# Patient Record
Sex: FEMALE | Race: WHITE | NOT HISPANIC OR LATINO | Employment: OTHER | URBAN - NONMETROPOLITAN AREA
[De-identification: names, ages, dates, MRNs, and addresses within clinical notes are randomized per-mention and may not be internally consistent; named-entity substitution may affect disease eponyms.]

---

## 2017-02-09 ENCOUNTER — OFFICE VISIT (OUTPATIENT)
Dept: ORTHOPEDIC SURGERY | Facility: CLINIC | Age: 67
End: 2017-02-09

## 2017-02-09 DIAGNOSIS — M17.12 PRIMARY OSTEOARTHRITIS OF LEFT KNEE: Primary | ICD-10-CM

## 2017-02-09 PROCEDURE — 20610 DRAIN/INJ JOINT/BURSA W/O US: CPT | Performed by: ORTHOPAEDIC SURGERY

## 2017-02-09 RX ADMIN — LIDOCAINE HYDROCHLORIDE 2 ML: 10 INJECTION, SOLUTION INFILTRATION; PERINEURAL at 13:01

## 2017-02-09 RX ADMIN — METHYLPREDNISOLONE ACETATE 160 MG: 80 INJECTION, SUSPENSION INTRA-ARTICULAR; INTRALESIONAL; INTRAMUSCULAR; SOFT TISSUE at 13:01

## 2017-02-09 NOTE — PROGRESS NOTES
Ronit Sarkar  ?  1950  ?  Chief Complaint   Patient presents with   • Left Knee - Follow-up     ?  HPI  Patient returns for an injection in the left knee today.  She has some medial sided joint line pain and tenderness.  Some difficulty in going up and down the steps.  Occasionally the knee will buckle and give out from underneath her.    Physical Exam    Review of Systems   Constitutional: Negative for chills, diaphoresis, fever and unexpected weight change.   HENT: Negative for hearing loss, nosebleeds, sore throat and tinnitus.    Eyes: Negative for pain and visual disturbance.   Respiratory: Negative for cough, shortness of breath and wheezing.    Cardiovascular: Negative for chest pain and palpitations.   Gastrointestinal: Negative for abdominal pain, diarrhea, nausea and vomiting.   Endocrine: Negative for cold intolerance, heat intolerance and polydipsia.   Genitourinary: Negative for difficulty urinating, dysuria and hematuria.   Musculoskeletal: Negative for arthralgias, joint swelling and myalgias.   Skin: Negative for rash and wound.   Allergic/Immunologic: Negative for environmental allergies.   Neurological: Negative for dizziness, syncope and numbness.   Hematological: Does not bruise/bleed easily.   Psychiatric/Behavioral: Negative for dysphoric mood and sleep disturbance. The patient is not nervous/anxious.        Joint/Body Part Specific Exam: KNEE: left knee. Patient has crepitus throughout range of motion. Positive patellar grind test. Mild effusion. Lachman is negative. Pivot shift is negative. Anterior and posterior drawer signs are negative. Significant joint line tenderness is noted on the medial aspect of the knee. Patient has a varus orientation of the knee. Range of motion in flexion is for 0- and 20° degrees. Neurovascular status intact.  Dorsalis pedis and posterior tibial artery pulses are palpable. Common peroneal nerve function is well preserved. Patients gait is cautious  and antalgic. Skin and soft tissues are swollen; consistent with synovitis and effusion    X-Ray Report:    Diagnostics:    Large Joint Arthrocentesis  Date/Time: 2/9/2017 1:01 PM  Consent given by: patient  Site marked: site marked  Timeout: Immediately prior to procedure a time out was called to verify the correct patient, procedure, equipment, support staff and site/side marked as required   Supporting Documentation  Indications: pain   Procedure Details  Preparation: Patient was prepped and draped in the usual sterile fashion  Needle size: 25 G  Approach: anteromedial  Medications administered: 2 mL lidocaine 1 %; 160 mg methylPREDNISolone acetate 80 MG/ML  Patient tolerance: patient tolerated the procedure well with no immediate complications        ?  ?  Plan      · Ice pack for 48 hrs     · Injected patients left knee joint(s) with Steroid    · ace wrap for swelling PRN    · Elevations for swelling PRN    · Tablet Ibuprofen 600 mg P.O. BID x 5 Days with meals    · Call office for any problems  With procedure    · Home Physical Therapy Program discussed with patient    · F/U in 3 months for Re-evaluation

## 2017-02-14 RX ORDER — METHYLPREDNISOLONE ACETATE 80 MG/ML
160 INJECTION, SUSPENSION INTRA-ARTICULAR; INTRALESIONAL; INTRAMUSCULAR; SOFT TISSUE
Status: COMPLETED | OUTPATIENT
Start: 2017-02-09 | End: 2017-02-09

## 2017-02-14 RX ORDER — LIDOCAINE HYDROCHLORIDE 10 MG/ML
2 INJECTION, SOLUTION INFILTRATION; PERINEURAL
Status: COMPLETED | OUTPATIENT
Start: 2017-02-09 | End: 2017-02-09

## 2017-05-23 ENCOUNTER — CLINICAL SUPPORT (OUTPATIENT)
Dept: ORTHOPEDIC SURGERY | Facility: CLINIC | Age: 67
End: 2017-05-23

## 2017-05-23 DIAGNOSIS — M17.12 PRIMARY OSTEOARTHRITIS OF LEFT KNEE: Primary | ICD-10-CM

## 2017-05-23 PROCEDURE — 20610 DRAIN/INJ JOINT/BURSA W/O US: CPT | Performed by: ORTHOPAEDIC SURGERY

## 2017-05-23 PROCEDURE — 99213 OFFICE O/P EST LOW 20 MIN: CPT | Performed by: ORTHOPAEDIC SURGERY

## 2017-05-23 RX ADMIN — METHYLPREDNISOLONE ACETATE 160 MG: 80 INJECTION, SUSPENSION INTRA-ARTICULAR; INTRALESIONAL; INTRAMUSCULAR; SOFT TISSUE at 09:44

## 2017-05-23 RX ADMIN — LIDOCAINE HYDROCHLORIDE 2 ML: 10 INJECTION, SOLUTION INFILTRATION; PERINEURAL at 09:44

## 2017-05-29 RX ORDER — METHYLPREDNISOLONE ACETATE 80 MG/ML
160 INJECTION, SUSPENSION INTRA-ARTICULAR; INTRALESIONAL; INTRAMUSCULAR; SOFT TISSUE
Status: COMPLETED | OUTPATIENT
Start: 2017-05-23 | End: 2017-05-23

## 2017-05-29 RX ORDER — LIDOCAINE HYDROCHLORIDE 10 MG/ML
2 INJECTION, SOLUTION INFILTRATION; PERINEURAL
Status: COMPLETED | OUTPATIENT
Start: 2017-05-23 | End: 2017-05-23

## 2017-08-31 ENCOUNTER — CLINICAL SUPPORT (OUTPATIENT)
Dept: ORTHOPEDIC SURGERY | Facility: CLINIC | Age: 67
End: 2017-08-31

## 2017-08-31 DIAGNOSIS — M17.12 PRIMARY OSTEOARTHRITIS OF LEFT KNEE: Primary | ICD-10-CM

## 2017-08-31 PROCEDURE — 99213 OFFICE O/P EST LOW 20 MIN: CPT | Performed by: ORTHOPAEDIC SURGERY

## 2017-08-31 PROCEDURE — 20610 DRAIN/INJ JOINT/BURSA W/O US: CPT | Performed by: ORTHOPAEDIC SURGERY

## 2017-08-31 RX ORDER — LIDOCAINE HYDROCHLORIDE 10 MG/ML
2 INJECTION, SOLUTION INFILTRATION; PERINEURAL
Status: COMPLETED | OUTPATIENT
Start: 2017-08-31 | End: 2017-08-31

## 2017-08-31 RX ORDER — METHYLPREDNISOLONE ACETATE 80 MG/ML
160 INJECTION, SUSPENSION INTRA-ARTICULAR; INTRALESIONAL; INTRAMUSCULAR; SOFT TISSUE
Status: COMPLETED | OUTPATIENT
Start: 2017-08-31 | End: 2017-08-31

## 2017-08-31 RX ADMIN — METHYLPREDNISOLONE ACETATE 160 MG: 80 INJECTION, SUSPENSION INTRA-ARTICULAR; INTRALESIONAL; INTRAMUSCULAR; SOFT TISSUE at 09:25

## 2017-08-31 RX ADMIN — LIDOCAINE HYDROCHLORIDE 2 ML: 10 INJECTION, SOLUTION INFILTRATION; PERINEURAL at 09:25

## 2017-12-07 ENCOUNTER — CLINICAL SUPPORT (OUTPATIENT)
Dept: ORTHOPEDIC SURGERY | Facility: CLINIC | Age: 67
End: 2017-12-07

## 2017-12-07 VITALS — HEIGHT: 63 IN | BODY MASS INDEX: 37.21 KG/M2 | WEIGHT: 210 LBS

## 2017-12-07 DIAGNOSIS — M17.12 PRIMARY OSTEOARTHRITIS OF LEFT KNEE: Primary | ICD-10-CM

## 2017-12-07 PROCEDURE — 20610 DRAIN/INJ JOINT/BURSA W/O US: CPT | Performed by: ORTHOPAEDIC SURGERY

## 2017-12-07 PROCEDURE — 99213 OFFICE O/P EST LOW 20 MIN: CPT | Performed by: ORTHOPAEDIC SURGERY

## 2017-12-07 RX ORDER — METHYLPREDNISOLONE ACETATE 80 MG/ML
160 INJECTION, SUSPENSION INTRA-ARTICULAR; INTRALESIONAL; INTRAMUSCULAR; SOFT TISSUE
Status: COMPLETED | OUTPATIENT
Start: 2017-12-07 | End: 2017-12-07

## 2017-12-07 RX ORDER — LIDOCAINE HYDROCHLORIDE 10 MG/ML
2 INJECTION, SOLUTION EPIDURAL; INFILTRATION; INTRACAUDAL; PERINEURAL
Status: COMPLETED | OUTPATIENT
Start: 2017-12-07 | End: 2017-12-07

## 2017-12-07 RX ADMIN — METHYLPREDNISOLONE ACETATE 160 MG: 80 INJECTION, SUSPENSION INTRA-ARTICULAR; INTRALESIONAL; INTRAMUSCULAR; SOFT TISSUE at 09:46

## 2017-12-07 RX ADMIN — LIDOCAINE HYDROCHLORIDE 2 ML: 10 INJECTION, SOLUTION EPIDURAL; INFILTRATION; INTRACAUDAL; PERINEURAL at 09:46

## 2017-12-07 NOTE — PROGRESS NOTES
Ronit Sarkar  ?  1950  ?  Chief Complaint   Patient presents with   • Left Knee - Follow-up     ?  HPI the patient is here today for left knee pain and discomfort. Pain is on the medial aspect of the patient's knee.  She has difficulty going up and down the steps.  Overall she does understand that she has arthritis of the knee and will eventually require a total knee replacement.  However, at this point her symptoms are fairly well controlled and I do not believe that she is a candidate for knee replacement surgery just yet.  We have discussed about Synvisc Visco supplementation as well as using a brace and anti-inflammatory medications.  She states that she will let me know when her symptoms are advanced enough for her to consider surgical replacement of the knee.    Physical Exam   Constitutional: She is oriented to person, place, and time. She appears well-nourished.   HENT:   Head: Atraumatic.   Eyes: EOM are normal.   Neck: Neck supple.   Cardiovascular: Normal rate, regular rhythm, normal heart sounds and intact distal pulses.    Pulmonary/Chest: Effort normal and breath sounds normal.   Abdominal: Soft. Bowel sounds are normal.   Musculoskeletal: She exhibits edema, tenderness and deformity.   Neurological: She is alert and oriented to person, place, and time. She has normal reflexes.   Skin: Skin is warm and dry.   Psychiatric: She has a normal mood and affect. Her behavior is normal. Judgment and thought content normal.   Nursing note and vitals reviewed.      Review of Systems   Constitutional: Negative.    HENT: Negative.    Eyes: Negative.    Respiratory: Negative.    Cardiovascular: Negative.    Gastrointestinal: Negative.    Endocrine: Negative.    Genitourinary: Negative.    Musculoskeletal: Positive for gait problem and joint swelling.   Skin: Negative.    Allergic/Immunologic: Negative.    Hematological: Negative.    Psychiatric/Behavioral: Negative.        Joint/Body Part Specific Exam:    left knee. Patient has crepitus throughout range of motion. Positive patellar grind test. Mild effusion. Lachman is negative. Pivot shift is negative. Anterior and posterior drawer signs are negative. Significant joint line tenderness is noted on the medial aspect of the knee. Patient has a varus orientation of the knee. Range of motion in flexion is for 0- 110° degrees. Neurovascular status intact.  Dorsalis pedis and posterior tibial artery pulses are palpable. Common peroneal nerve function is well preserved. Patients gait is cautious and antalgic. Skin and soft tissues are swollen; consistent with synovitis and effusion.  Patient has a distant limp especially when she first starts to walk and she first gets out of the chair.  X-Ray Report:    Diagnostics:    Large Joint Arthrocentesis  Date/Time: 12/7/2017 9:46 AM  Consent given by: patient  Site marked: site marked  Timeout: Immediately prior to procedure a time out was called to verify the correct patient, procedure, equipment, support staff and site/side marked as required   Supporting Documentation  Indications: pain   Procedure Details  Location: knee - L knee  Preparation: Patient was prepped and draped in the usual sterile fashion  Needle size: 25 G  Approach: anteromedial  Medications administered: 160 mg methylPREDNISolone acetate 80 MG/ML; 2 mL lidocaine PF 1% 1 %  Patient tolerance: patient tolerated the procedure well with no immediate complications        ?  ?  Plan      · Ice pack for 48 hrs     · Injected patients left knee joint with Steroid    · Ace wrap for swelling PRN    · Elevation for swelling PRN    · Tablet Ibuprofen 600 mg P.O. BID x 5 Days with meals    · Call office for any problems  With procedure    · Home Physical Therapy Program discussed with patient    · F/U in 3 months for Re-evaluation.  ·   · Use a supportive brace on the knee to prevent it from buckling and giving out  ·   · Eventually she will need a total knee  arthroplasty and she will let me know when her symptoms are advanced enough.

## 2018-04-12 ENCOUNTER — CLINICAL SUPPORT (OUTPATIENT)
Dept: ORTHOPEDIC SURGERY | Facility: CLINIC | Age: 68
End: 2018-04-12

## 2018-04-12 DIAGNOSIS — M25.562 PAIN IN BOTH KNEES, UNSPECIFIED CHRONICITY: Primary | ICD-10-CM

## 2018-04-12 DIAGNOSIS — M25.561 PAIN IN BOTH KNEES, UNSPECIFIED CHRONICITY: Primary | ICD-10-CM

## 2018-04-12 PROCEDURE — 20610 DRAIN/INJ JOINT/BURSA W/O US: CPT | Performed by: ORTHOPAEDIC SURGERY

## 2018-04-12 RX ORDER — LIDOCAINE HYDROCHLORIDE 10 MG/ML
2 INJECTION, SOLUTION EPIDURAL; INFILTRATION; INTRACAUDAL; PERINEURAL
Status: COMPLETED | OUTPATIENT
Start: 2018-04-12 | End: 2018-04-12

## 2018-04-12 RX ORDER — METHYLPREDNISOLONE ACETATE 80 MG/ML
160 INJECTION, SUSPENSION INTRA-ARTICULAR; INTRALESIONAL; INTRAMUSCULAR; SOFT TISSUE
Status: COMPLETED | OUTPATIENT
Start: 2018-04-12 | End: 2018-04-12

## 2018-04-12 RX ADMIN — LIDOCAINE HYDROCHLORIDE 2 ML: 10 INJECTION, SOLUTION EPIDURAL; INFILTRATION; INTRACAUDAL; PERINEURAL at 14:04

## 2018-04-12 RX ADMIN — LIDOCAINE HYDROCHLORIDE 2 ML: 10 INJECTION, SOLUTION EPIDURAL; INFILTRATION; INTRACAUDAL; PERINEURAL at 14:03

## 2018-04-12 RX ADMIN — METHYLPREDNISOLONE ACETATE 160 MG: 80 INJECTION, SUSPENSION INTRA-ARTICULAR; INTRALESIONAL; INTRAMUSCULAR; SOFT TISSUE at 14:04

## 2018-04-12 RX ADMIN — METHYLPREDNISOLONE ACETATE 160 MG: 80 INJECTION, SUSPENSION INTRA-ARTICULAR; INTRALESIONAL; INTRAMUSCULAR; SOFT TISSUE at 14:03
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
## 2018-07-12 ENCOUNTER — CLINICAL SUPPORT (OUTPATIENT)
Dept: ORTHOPEDIC SURGERY | Facility: CLINIC | Age: 68
End: 2018-07-12

## 2018-07-12 DIAGNOSIS — M25.561 PAIN IN BOTH KNEES, UNSPECIFIED CHRONICITY: Primary | ICD-10-CM

## 2018-07-12 DIAGNOSIS — M25.562 PAIN IN BOTH KNEES, UNSPECIFIED CHRONICITY: Primary | ICD-10-CM

## 2018-07-12 DIAGNOSIS — M17.12 PRIMARY OSTEOARTHRITIS OF LEFT KNEE: ICD-10-CM

## 2018-07-12 PROCEDURE — 20610 DRAIN/INJ JOINT/BURSA W/O US: CPT | Performed by: ORTHOPAEDIC SURGERY

## 2018-07-12 PROCEDURE — 99213 OFFICE O/P EST LOW 20 MIN: CPT | Performed by: ORTHOPAEDIC SURGERY

## 2018-07-12 RX ORDER — LIDOCAINE HYDROCHLORIDE 10 MG/ML
2 INJECTION, SOLUTION EPIDURAL; INFILTRATION; INTRACAUDAL; PERINEURAL
Status: COMPLETED | OUTPATIENT
Start: 2018-07-12 | End: 2018-07-12

## 2018-07-12 RX ORDER — METHYLPREDNISOLONE ACETATE 80 MG/ML
160 INJECTION, SUSPENSION INTRA-ARTICULAR; INTRALESIONAL; INTRAMUSCULAR; SOFT TISSUE
Status: COMPLETED | OUTPATIENT
Start: 2018-07-12 | End: 2018-07-12

## 2018-07-12 RX ADMIN — LIDOCAINE HYDROCHLORIDE 2 ML: 10 INJECTION, SOLUTION EPIDURAL; INFILTRATION; INTRACAUDAL; PERINEURAL at 11:31

## 2018-07-12 RX ADMIN — METHYLPREDNISOLONE ACETATE 160 MG: 80 INJECTION, SUSPENSION INTRA-ARTICULAR; INTRALESIONAL; INTRAMUSCULAR; SOFT TISSUE at 11:30

## 2018-07-12 RX ADMIN — METHYLPREDNISOLONE ACETATE 160 MG: 80 INJECTION, SUSPENSION INTRA-ARTICULAR; INTRALESIONAL; INTRAMUSCULAR; SOFT TISSUE at 11:31

## 2018-07-12 RX ADMIN — LIDOCAINE HYDROCHLORIDE 2 ML: 10 INJECTION, SOLUTION EPIDURAL; INFILTRATION; INTRACAUDAL; PERINEURAL at 11:30

## 2018-07-12 NOTE — PROGRESS NOTES
Chief Complaint   Patient presents with   • Right Knee - Follow-up   • Left Knee - Follow-up           HPI  Patient returns for bilateral knee Pain and discomfort.  She has difficulty going up and down the steps.  She states that there are some days when her knee symptoms are very tolerable.  There are other days when she has a lot of pain and discomfort.  The left knee is more symptomatic than the right side.  She is eventually, most certainly going to need a total knee arthroplasty on the left side.  The patient states that she would like to defer surgical intervention as long as she is taking care of her  who has multiple medical issues.  She will let me know when she is ready to proceed with surgical intervention based on intolerable pain, symptoms of discomfort and progressive varus deformity.         There were no vitals filed for this visit.        Review of Systems   Constitutional: Negative.    HENT: Negative.    Eyes: Negative.    Respiratory: Negative.    Cardiovascular: Negative.    Gastrointestinal: Negative.    Endocrine: Negative.    Genitourinary: Negative.    Musculoskeletal: Positive for joint swelling.   Skin: Negative.    Allergic/Immunologic: Negative.    Neurological: Negative.    Hematological: Negative.    Psychiatric/Behavioral: Negative.            Physical Exam   Constitutional: She is oriented to person, place, and time. She appears well-developed and well-nourished.   HENT:   Head: Normocephalic and atraumatic.   Eyes: EOM are normal.   Neck: Neck supple.   Cardiovascular: Regular rhythm, normal heart sounds and intact distal pulses.    Pulmonary/Chest: Effort normal and breath sounds normal.   Abdominal: Bowel sounds are normal.   Musculoskeletal: She exhibits edema and tenderness.   Neurological: She is alert and oriented to person, place, and time.   Skin: Skin is dry. Capillary refill takes 2 to 3 seconds.   Psychiatric: She has a normal mood and affect. Her behavior is  normal. Judgment and thought content normal.   Nursing note and vitals reviewed.          Joint/Body Part Specific Exam:  bilateral knee. Patient has crepitus throughout range of motion. Positive patellar grind test. Mild effusion. Lachman is negative. Pivot shift is negative. Anterior and posterior drawer signs are negative. Significant joint line tenderness is noted on the medial aspect of the knee. Patient has a varus orientation of the knee. There is fullness and tenderness in the Popliteal fossa. Mild distention of a Popliteal cyst is noted in this location. Range of motion in flexion is from 0- 110° degrees. Neurovascular status is intact.  Dorsalis pedis and posterior tibial artery pulses are palpable. Common peroneal nerve function is well preserved. Patient's gait is cautious and antalgic. Skin and soft tissues are mildly swollen, consistent with synovitis and effusion. The patient has a significant limp with the first few steps after starting the gait cycle. Getting out of a chair takes a lot of effort due to pain on knee flexion.      X-RAY Report:        Diagnostics:        Ronit was seen today for follow-up and follow-up.    Diagnoses and all orders for this visit:    Pain in both knees, unspecified chronicity    Primary osteoarthritis of left knee            Large Joint Arthrocentesis  Date/Time: 7/12/2018 11:30 AM  Consent given by: patient  Site marked: site marked  Timeout: Immediately prior to procedure a time out was called to verify the correct patient, procedure, equipment, support staff and site/side marked as required   Supporting Documentation  Indications: pain   Procedure Details  Location: knee - R knee  Preparation: Patient was prepped and draped in the usual sterile fashion  Needle size: 25 G  Approach: anteromedial  Medications administered: 160 mg methylPREDNISolone acetate 80 MG/ML; 2 mL lidocaine PF 1% 1 %  Patient tolerance: patient tolerated the procedure well with no immediate  complications    Large Joint Arthrocentesis  Date/Time: 7/12/2018 11:31 AM  Consent given by: patient  Site marked: site marked  Timeout: Immediately prior to procedure a time out was called to verify the correct patient, procedure, equipment, support staff and site/side marked as required   Supporting Documentation  Indications: pain   Procedure Details  Location: knee - L knee  Preparation: Patient was prepped and draped in the usual sterile fashion  Needle size: 25 G  Approach: anteromedial  Medications administered: 160 mg methylPREDNISolone acetate 80 MG/ML; 2 mL lidocaine PF 1% 1 %  Patient tolerance: patient tolerated the procedure well with no immediate complications              Plan:  Injected patient's left knee with a steroid from an anteromedial approach.    Injected patient's right knee with a steroid from an anteromedial approach.    Use a supportive brace on the knee to prevent it from buckling and giving out.    Calcium and vitamin D for bone health.    Synvisc Visco supplementation discussed with the patient in detail.    Use a cane for assistance with ambulation.    Tablet ibuprofen 600 mg orally twice a day for pain and discomfort.    Follow-up in my office in 3 months for reevaluation.    Discussed the performance of eventual knee replacement for her left knee in the long-term.

## 2018-10-11 ENCOUNTER — CLINICAL SUPPORT (OUTPATIENT)
Dept: ORTHOPEDIC SURGERY | Facility: CLINIC | Age: 68
End: 2018-10-11

## 2018-10-11 DIAGNOSIS — M17.12 PRIMARY OSTEOARTHRITIS OF LEFT KNEE: ICD-10-CM

## 2018-10-11 DIAGNOSIS — M25.561 PAIN IN BOTH KNEES, UNSPECIFIED CHRONICITY: Primary | ICD-10-CM

## 2018-10-11 DIAGNOSIS — M25.562 PAIN IN BOTH KNEES, UNSPECIFIED CHRONICITY: Primary | ICD-10-CM

## 2018-10-11 PROCEDURE — 20610 DRAIN/INJ JOINT/BURSA W/O US: CPT | Performed by: ORTHOPAEDIC SURGERY

## 2018-10-11 RX ADMIN — LIDOCAINE HYDROCHLORIDE 3 ML: 10 INJECTION, SOLUTION EPIDURAL; INFILTRATION; INTRACAUDAL; PERINEURAL at 11:11

## 2018-10-11 RX ADMIN — METHYLPREDNISOLONE ACETATE 160 MG: 80 INJECTION, SUSPENSION INTRA-ARTICULAR; INTRALESIONAL; INTRAMUSCULAR; SOFT TISSUE at 11:11

## 2018-10-11 RX ADMIN — LIDOCAINE HYDROCHLORIDE 2 ML: 10 INJECTION, SOLUTION EPIDURAL; INFILTRATION; INTRACAUDAL; PERINEURAL at 11:12

## 2018-10-11 RX ADMIN — METHYLPREDNISOLONE ACETATE 160 MG: 80 INJECTION, SUSPENSION INTRA-ARTICULAR; INTRALESIONAL; INTRAMUSCULAR; SOFT TISSUE at 11:12

## 2018-10-11 NOTE — PROGRESS NOTES
Chief Complaint   Patient presents with   • Left Knee - Follow-up   • Right Knee - Follow-up           HPI  Patient returns for bilateral knee injections.  She has pain and discomfort in both knees.  The pain is on the medial aspect of the knee.  Cross body activities bother her significantly.  She has a difficult time going up and down the steps.  She states that she has a family commitment and therefore cannot consider knee replacement surgery just yet but eventually she is thinking about that form of surgical intervention.         There were no vitals filed for this visit.        Review of Systems        Physical Exam        Joint/Body Part Specific Exam:  bilateral knee. Patient has crepitus throughout range of motion. Positive patellar grind test. Mild effusion. Lachman is negative. Pivot shift is negative. Anterior and posterior drawer signs are negative. Significant joint line tenderness is noted on the medial aspect of the knee. Patient has a varus orientation of the knee. There is fullness and tenderness in the Popliteal fossa. Mild distention of a Popliteal cyst is noted in this location. Range of motion in flexion is from 0- 110 degrees. Neurovascular status is intact.  Dorsalis pedis and posterior tibial artery pulses are palpable. Common peroneal nerve function is well preserved. Patient's gait is cautious and antalgic. Skin and soft tissues are mildly swollen, consistent with synovitis and effusion. The patient has a significant limp with the first few steps after starting the gait cycle. Getting out of a chair takes a lot of effort due to pain on knee flexion.      X-RAY Report:        Diagnostics:        Ronit was seen today for follow-up and follow-up.    Diagnoses and all orders for this visit:    Pain in both knees, unspecified chronicity    Primary osteoarthritis of left knee    Other orders  -     Large Joint Arthrocentesis  -     Large Joint Arthrocentesis            Large Joint  Arthrocentesis  Date/Time: 10/11/2018 11:11 AM  Consent given by: patient  Site marked: site marked  Timeout: Immediately prior to procedure a time out was called to verify the correct patient, procedure, equipment, support staff and site/side marked as required   Supporting Documentation  Indications: pain   Procedure Details  Location: knee - R knee  Preparation: Patient was prepped and draped in the usual sterile fashion  Needle size: 25 G  Approach: anteromedial  Medications administered: 160 mg methylPREDNISolone acetate 80 MG/ML; 3 mL lidocaine PF 1% 1 %  Patient tolerance: patient tolerated the procedure well with no immediate complications    Large Joint Arthrocentesis  Date/Time: 10/11/2018 11:12 AM  Consent given by: patient  Site marked: site marked  Timeout: Immediately prior to procedure a time out was called to verify the correct patient, procedure, equipment, support staff and site/side marked as required   Supporting Documentation  Indications: pain   Procedure Details  Location: knee - L knee  Preparation: Patient was prepped and draped in the usual sterile fashion  Needle size: 25 G  Approach: anteromedial  Medications administered: 160 mg methylPREDNISolone acetate 80 MG/ML; 2 mL lidocaine PF 1% 1 %  Patient tolerance: patient tolerated the procedure well with no immediate complications              Plan: Incision site right knee with a steroid from an anteromedial approach.    Injected patient's left knee with a steroid from an anteromedial approach.    Ice to the knees.    Uses supportive brace to the knee to prevent it from buckling and giving out.    I have discussed knee replacement surgery with the patient and she will let me know when she is ready to schedule surgical intervention for symptom relief.    Follow-up in my office in 3-4 months

## 2018-10-27 RX ORDER — METHYLPREDNISOLONE ACETATE 80 MG/ML
160 INJECTION, SUSPENSION INTRA-ARTICULAR; INTRALESIONAL; INTRAMUSCULAR; SOFT TISSUE
Status: COMPLETED | OUTPATIENT
Start: 2018-10-11 | End: 2018-10-11

## 2018-10-27 RX ORDER — LIDOCAINE HYDROCHLORIDE 10 MG/ML
3 INJECTION, SOLUTION EPIDURAL; INFILTRATION; INTRACAUDAL; PERINEURAL
Status: COMPLETED | OUTPATIENT
Start: 2018-10-11 | End: 2018-10-11

## 2018-10-27 RX ORDER — LIDOCAINE HYDROCHLORIDE 10 MG/ML
2 INJECTION, SOLUTION EPIDURAL; INFILTRATION; INTRACAUDAL; PERINEURAL
Status: COMPLETED | OUTPATIENT
Start: 2018-10-11 | End: 2018-10-11

## 2019-01-24 ENCOUNTER — CLINICAL SUPPORT (OUTPATIENT)
Dept: ORTHOPEDIC SURGERY | Facility: CLINIC | Age: 69
End: 2019-01-24

## 2019-01-24 DIAGNOSIS — M25.562 CHRONIC PAIN OF BOTH KNEES: Primary | ICD-10-CM

## 2019-01-24 DIAGNOSIS — M25.561 CHRONIC PAIN OF BOTH KNEES: Primary | ICD-10-CM

## 2019-01-24 DIAGNOSIS — M17.12 PRIMARY OSTEOARTHRITIS OF LEFT KNEE: ICD-10-CM

## 2019-01-24 DIAGNOSIS — G89.29 CHRONIC PAIN OF BOTH KNEES: Primary | ICD-10-CM

## 2019-01-24 PROCEDURE — 20610 DRAIN/INJ JOINT/BURSA W/O US: CPT | Performed by: ORTHOPAEDIC SURGERY

## 2019-01-24 PROCEDURE — 99214 OFFICE O/P EST MOD 30 MIN: CPT | Performed by: ORTHOPAEDIC SURGERY

## 2019-01-24 PROCEDURE — 73562 X-RAY EXAM OF KNEE 3: CPT | Performed by: ORTHOPAEDIC SURGERY

## 2019-01-24 RX ADMIN — LIDOCAINE HYDROCHLORIDE 2 ML: 10 INJECTION, SOLUTION INFILTRATION; PERINEURAL at 12:03

## 2019-01-24 RX ADMIN — LIDOCAINE HYDROCHLORIDE 2 ML: 10 INJECTION, SOLUTION INFILTRATION; PERINEURAL at 12:02

## 2019-01-24 RX ADMIN — METHYLPREDNISOLONE ACETATE 160 MG: 80 INJECTION, SUSPENSION INTRA-ARTICULAR; INTRALESIONAL; INTRAMUSCULAR; SOFT TISSUE at 12:02

## 2019-01-24 RX ADMIN — METHYLPREDNISOLONE ACETATE 160 MG: 80 INJECTION, SUSPENSION INTRA-ARTICULAR; INTRALESIONAL; INTRAMUSCULAR; SOFT TISSUE at 12:03

## 2019-01-24 NOTE — PROGRESS NOTES
Ronit Sarkar  ?  1950  ?  Chief Complaint   Patient presents with   • Right Knee - Follow-up   • Left Knee - Follow-up     ?  HPI   The patient is here today for a follow up of her right and left knee.  She continues to have pain in both knees but greatest in her left knee.  She states the left knee has become so bothersome she has to use a scooter while at [facility].  She has difficulty walking and reports popping of the knee.  She has tried every form of conservative, nonoperative management including braces, intra-articular injections and physical therapy.  None of these options seem to be helping her manage her quality of life.  She has recently lost her  and states that now she has no one at home to take care of and therefore she is able to focus on her own health.  She is wanting to improve her quality of life and would like to walk for exercise.  Because of all these factors,  At this point she is ready to go forward with a total knee arthroplasty of the left side.    Physical Exam   Constitutional: She is oriented to person, place, and time. She appears well-developed and well-nourished.   HENT:   Head: Normocephalic and atraumatic.   Eyes: Conjunctivae and EOM are normal. Pupils are equal, round, and reactive to light.   Cardiovascular: Normal rate, regular rhythm and normal heart sounds.   Pulmonary/Chest: Effort normal and breath sounds normal.   Musculoskeletal: She exhibits edema and tenderness.   Neurological: She is alert and oriented to person, place, and time.   Skin: Skin is warm and dry. Capillary refill takes less than 2 seconds.   Psychiatric: She has a normal mood and affect. Her behavior is normal. Judgment and thought content normal.   Nursing note and vitals reviewed.      Review of Systems   Musculoskeletal: Positive for arthralgias, gait problem and joint swelling.   All other systems reviewed and are negative.      Joint/Body Part Specific Exam:   left knee. Positive grind  test. Pain and tenderness is present over the lateral aspect of the knee. Patient has some tenderness and irritability of the common peroneal nerve. Lateral joint line is exquisitely painful and tender. Patella is tending to track a little bit laterally. There is thickening of the synovial membrane. Range of motion in flexion is 0-100 degrees. Dorsalis pedis and posterior tibial artery pulses are palpable. Common peroneal nerve function is well preserved. Gait is cautious and antalgic. The patient has valgus orientation of the knee with a higher degree of external rotation than the contralateral side.There is fullness and tenderness in the Popliteal fossa. Getting out of a seated position is painful for the patient.    right knee. Patient has crepitus throughout range of motion. Positive patellar grind test. Mild effusion. Lachman is negative. Pivot shift is negative. Anterior and posterior drawer signs are negative. Significant joint line tenderness is noted on the medial aspect of the knee. Patient has a varus orientation of the knee. There is fullness and tenderness in the Popliteal fossa. Mild distention of a Popliteal cyst is noted in this location. Range of motion in flexion is from 0- 110 degrees. Neurovascular status is intact.  Dorsalis pedis and posterior tibial artery pulses are palpable. Common peroneal nerve function is well preserved. Patient's gait is cautious and antalgic. Skin and soft tissues are mildly swollen, consistent with synovitis and effusion. The patient has a significant limp with the first few steps after starting the gait cycle. Getting out of a chair takes a lot of effort due to pain on knee flexion.    X-Ray Report: Previously obtained x-ray reports are reviewed.  She has complete loss of joint space.  She has a valgus deformity of the knee with bone-on-bone appearance.  Subchondral cyst formation is noted.  There are bridging osteophytes present over the patellofemoral joint.  These  x-rays are graded according to the K-L grading scale and basically she is at the grade 4 level.    bilateral Knee X-Ray  Indication: evaluation of pain and progressive valgus deformity of the knee  AP, Lateral views  Findings: Advanced degenerative osteoarthritis with complete lateral loss of joint space with irregular articular surface contour  no bony lesion  Soft tissues within normal limits  decreased joint spaces  Hardware appropriately positioned not applicable      yes prior studies available for comparison.    This patient's x-ray report was graded according to the Kellgren and Hussein classification.  This took into account the joint space narrowing, osteophyte formation, sclerosis of the distal femur/proximal tibia along with deformity of those bones.  The findings were indicative of K L grade 4.    X-RAY was ordered and reviewed by Robbin Maloney MD    Diagnostics:    Ronit was seen today for follow-up and follow-up.    Diagnoses and all orders for this visit:    Chronic pain of both knees  -     Large Joint Arthrocentesis: R knee  -     Large Joint Arthrocentesis: L knee  -     XR Knee 3 View Bilateral  -     lidocaine (XYLOCAINE) 1 % injection 2 mL; 2 mL Once.  -     lidocaine (XYLOCAINE) 1 % injection 2 mL; 2 mL Once.  -     methylPREDNISolone acetate (DEPO-medrol) injection 160 mg; 160 mg Once.  -     methylPREDNISolone acetate (DEPO-medrol) injection 160 mg; 160 mg Once.    Primary osteoarthritis of left knee  -     External [facility] Surgical/Procedural Request  -     Urinalysis With Culture If Indicated - Urine, Clean Catch; Future  -     ceFAZolin (ANCEF) 2 g in sodium chloride 0.9 % 100 mL IVPB; Infuse 2 g into a venous catheter 1 (One) Time.    Other orders  -     Follow Anesthesia Guidelines / Standing Orders; Future  -     Obtain informed consent  -     Care Order / Instruction for all Female Patients  -     Follow anesthesia standing orders.; Standing          Large Joint  Arthrocentesis: R knee  Date/Time: 1/24/2019 12:02 PM  Consent given by: patient  Site marked: site marked  Timeout: Immediately prior to procedure a time out was called to verify the correct patient, procedure, equipment, support staff and site/side marked as required   Supporting Documentation  Indications: pain   Procedure Details  Location: knee - R knee  Preparation: Patient was prepped and draped in the usual sterile fashion  Needle size: 25 G  Approach: anteromedial  Medications administered: 2 mL lidocaine 1 %; 160 mg methylPREDNISolone acetate 80 MG/ML  Patient tolerance: patient tolerated the procedure well with no immediate complications    Large Joint Arthrocentesis: L knee  Date/Time: 1/24/2019 12:03 PM  Consent given by: patient  Site marked: site marked  Timeout: Immediately prior to procedure a time out was called to verify the correct patient, procedure, equipment, support staff and site/side marked as required   Supporting Documentation  Indications: pain   Procedure Details  Location: knee - L knee  Preparation: Patient was prepped and draped in the usual sterile fashion  Needle size: 25 G  Approach: anteromedial  Medications administered: 2 mL lidocaine 1 %; 160 mg methylPREDNISolone acetate 80 MG/ML  Patient tolerance: patient tolerated the procedure well with no immediate complications        ?  ?  Plan      · Ice pack for 48 hrs     · Injected patient's right and left knee joints with Steroid    · Ace wrap for swelling PRN    · Elevation for swelling PRN    · Tablet Ibuprofen 600 mg P.O. BID x 5 Days with meals    · Call office for any problems with procedure    · Home Physical Therapy Program discussed with patient    · We will schedule her for a left TKA.  ·   · Risks and benefits discussed with the patient and her daughter to the extent of 30 minutes.  The decision-making process and potential complications discussed at length.  ·   · Time spent in the office today during this discussion is  30 minutes of which greater than 50% of my time was spent face-to-face with the patient going over the potential complications and the postoperative rehabilitation.  ·   The patient was seen today for preoperative discussion.  The patient has been tried on over-the-counter and prescription NSAID's despite the risks of anti-inflammatory bleeding, peptic ulcers and erosive gastritis with short term benefit only.  Braces have been prescribed for mechanical support.  Patient has been participating in an exercise program specifically targeting joint pain relief with limited benefit. Intraarticular injections have been used periodically with some but not complete relief of pain.  Ambulation aids have also been utilized.      · The details of the surgical procedure were explained including the location of probable incisions and a description of the likely hardware/grafts to be used. The patient understands the likely convalescence after surgery as well as the rehabilitation required.  Also, we have thoroughly discussed with the patient the risks, benefits and alternatives to surgery.  Risks include but are not limited to the risk of infection, joint stiffness, limited range of motion, wound healing problems, scar tissue build up, myocardial infarction, stroke, blood clots (including DVT and/or pulmonary embolus along with the risk of death) neurologic and/or vascular injury, limb length discrepancy, fracture, dislocation, nonunion, malunion, continued pain and need for further surgery including hardware failure requiring revision.

## 2019-01-27 RX ORDER — METHYLPREDNISOLONE ACETATE 80 MG/ML
160 INJECTION, SUSPENSION INTRA-ARTICULAR; INTRALESIONAL; INTRAMUSCULAR; SOFT TISSUE
Status: COMPLETED | OUTPATIENT
Start: 2019-01-24 | End: 2019-01-24

## 2019-01-27 RX ORDER — LIDOCAINE HYDROCHLORIDE 10 MG/ML
2 INJECTION, SOLUTION INFILTRATION; PERINEURAL
Status: COMPLETED | OUTPATIENT
Start: 2019-01-24 | End: 2019-01-24

## 2019-02-07 ENCOUNTER — TELEPHONE (OUTPATIENT)
Dept: ORTHOPEDIC SURGERY | Facility: CLINIC | Age: 69
End: 2019-02-07

## 2019-02-21 ENCOUNTER — TELEPHONE (OUTPATIENT)
Dept: ORTHOPEDIC SURGERY | Facility: CLINIC | Age: 69
End: 2019-02-21

## 2019-02-21 NOTE — TELEPHONE ENCOUNTER
PATIENT CALLED AND WOULD LIKE SOMETHING CALLED IN FOR INFLAMMATION. HER SURGERY IS NOT UNTIL MARCH/RJ

## 2019-02-21 NOTE — TELEPHONE ENCOUNTER
Please call the patient in tablet Celebrex 100 mg orally nightly for pain swelling and discomfort.  Total 40 pills

## 2019-03-14 DIAGNOSIS — M17.12 PRIMARY OSTEOARTHRITIS OF LEFT KNEE: ICD-10-CM

## 2019-03-25 ENCOUNTER — OUTSIDE FACILITY SERVICE (OUTPATIENT)
Dept: ORTHOPEDIC SURGERY | Facility: CLINIC | Age: 69
End: 2019-03-25

## 2019-03-25 PROCEDURE — 27447 TOTAL KNEE ARTHROPLASTY: CPT | Performed by: ORTHOPAEDIC SURGERY

## 2019-03-27 ENCOUNTER — TELEPHONE (OUTPATIENT)
Dept: ORTHOPEDIC SURGERY | Facility: CLINIC | Age: 69
End: 2019-03-27

## 2019-03-27 NOTE — TELEPHONE ENCOUNTER
MARZENA WITH [facility] LEFT VOICEMAIL AT 1:22 PM STATING THAT THEY RECEIVED ORDERS TO SEE PATIENT AND WENT OUT TODAY FOR FIRST VISIT.  PATIENT IS S/P LEFT TOTAL KNEE REPLACEMENT 3/25/19

## 2019-04-01 ENCOUNTER — TELEPHONE (OUTPATIENT)
Dept: ORTHOPEDIC SURGERY | Facility: CLINIC | Age: 69
End: 2019-04-01

## 2019-04-01 PROBLEM — Z96.652 S/P TOTAL KNEE ARTHROPLASTY, LEFT: Status: ACTIVE | Noted: 2019-04-01

## 2019-04-01 NOTE — TELEPHONE ENCOUNTER
PATIENT CALLED AND IS REQUESTING A REFILL ON HER PAIN MEDICATION (OXYCODONE 5/325).  PATIENT IS NEEDING TO TAKE 1 EVERY 4 TO 5 HOURS FOR PAIN.  PATIENT IS S/P LEFT TOTAL KNEE REPLACEMENT ON 3/25/19.  PLEASE SEND PRESCRIPTION TO [facility] IN [place]./HALLIE

## 2019-04-01 NOTE — TELEPHONE ENCOUNTER
PATIENT'S DAUGHTER CALLED AGAIN REGARDING RX AND WANTED TO LET OUR OFFICE KNOW THAT WHEN THEY FILLED THE LAST RX FOR THE OXYCODONE 5/325 1-2 TABLETS EVERY 4-6 HOURS #[facility] TOLD THEM THAT INSURANCE WOULD NOT COVER IT AND THEY HAD TO PAY $50 OUT OF POCKET. I CALLED AND SPOKE TO TECHNICIAN AT [facility] AND SHE STATES THAT INSURANCE WOULDN'T COVER THE OXYCODONE ON 3/26/19 BC SHE HAD FILLED AN ATIVAN RX FROM DR. ISELA OWENS ON 3/22/19. I EXPLAINED THAT WE COULD SEND A REFILL BUT WAS NOT SURE IF INSURANCE WOULD COVER IT/RJ

## 2019-04-01 NOTE — PROGRESS NOTES
POST OP TOTAL GLOBAL      NAME: Ronit Sarkar    : 1950    MRN: [id number]  ?  Chief Complaint   Patient presents with   • Left Knee - Follow-up   • Wound Check     ?  HPI:   Patient returns today for 11 day(s) follow up of left total knee arthroplasty. Incision(s) healing nicely/as expected with no signs of infection. Patient reports doing well with no unusual complaints. No fevers, rigors or chills. Appears to be progressing appropriately. Patient is on appropriate anticoagulation.  Pain level rated as moderate although she has decreased her pain medication from 2 pills to 1 most of the time.  She will need a refill on pain medicine.  Patient is using Home Health for physical therapy.      Review of Systems:      Ortho Exam:   Left knee.The patient is status post total knee arthroplasty postoperative 11 day(s). Incision is clean and with staples in place. Calf is soft and nontender. Homans sign is negative. There is no clicking, popping or catching. Anterior and posterior drawer signs are negative.  There is no instability of the components. Appropriate amounts of swelling and bruising are noted. Dorsalis pedis and posterior tibial artery pulses are palpable. Common peroneal nerve function is well preserved. Range of motion is from 15-85 degrees of flexion. Gait is cautious but otherwise fairly normal. There is no evidence of a deep seated joint infection.      Diagnostic Studies:  We will obtain x-rays at next visit      Assessment:  Ronit was seen today for wound check and follow-up.    Diagnoses and all orders for this visit:    S/P total knee arthroplasty, left    Other orders  -     oxyCODONE-acetaminophen (PERCOCET) 5-325 MG per tablet; Take one by mouth every 4-6 hours prn pain        Plan   · Incision care  · To use ACE wrap/JORGE stocking  · Continue ice to joint   · Stretching and strengthening exercises  · ROM: Aggressive  · Falls precautions  · Once Xarelto is completed, change to an  enteric coated Aspirin 325 mg daily until 6 weeks following your surgery  · Continue with lifelong antibiotic prophylaxis with dental procedures following total joint replacement.  · Follow up in 1 week for staple removal and in 5 week(s) for regular follow-up with x-rays      Danny Aguayo PA-C  4/5/2019

## 2019-04-02 NOTE — TELEPHONE ENCOUNTER
Patient followed up with PCP yesterday. Dr. Nicolas Sultana refilled this for her at this time. They are aware that from now on we will be managing her pain medication.

## 2019-04-02 NOTE — TELEPHONE ENCOUNTER
Okay to send in a prescription of tablet Percocet 5/325 mg tab 1 p.o. q. 6-8 as needed pain total 40 pills no refills.  Please send it in electronically and I will sign it for her.

## 2019-04-05 ENCOUNTER — TELEPHONE (OUTPATIENT)
Dept: ORTHOPEDIC SURGERY | Facility: CLINIC | Age: 69
End: 2019-04-05

## 2019-04-05 ENCOUNTER — OFFICE VISIT (OUTPATIENT)
Dept: ORTHOPEDIC SURGERY | Facility: CLINIC | Age: 69
End: 2019-04-05

## 2019-04-05 VITALS — BODY MASS INDEX: 37.73 KG/M2 | WEIGHT: 205 LBS | HEIGHT: 62 IN | TEMPERATURE: 97.5 F

## 2019-04-05 DIAGNOSIS — Z96.652 S/P TOTAL KNEE ARTHROPLASTY, LEFT: Primary | ICD-10-CM

## 2019-04-05 PROCEDURE — 99024 POSTOP FOLLOW-UP VISIT: CPT | Performed by: PHYSICIAN ASSISTANT

## 2019-04-05 RX ORDER — OXYCODONE HYDROCHLORIDE AND ACETAMINOPHEN 5; 325 MG/1; MG/1
TABLET ORAL
Qty: 40 TABLET | Refills: 0 | Status: SHIPPED | OUTPATIENT
Start: 2019-04-05

## 2019-04-05 NOTE — TELEPHONE ENCOUNTER
MELINDA WITH [facility] CALLED TO OBTAIN DIAGNOSIS FOR PATIENT AND TO MAKE SURE THAT DR. SIMPSON IS AWARE THAT PATIENT FILLED A PRESCRIPTION FOR PERCOCET FOR A 3 DAY SUPPLY ON 4/02/19.  HE ALSO WANTED TO MAKE SURE THAT DR. SIMPSON IS AWARE THAT PATIENT DOES HAVE A PRESCRIPTION FOR LORAZAPAM.  MELINDA DID SPEAK WITH PATIENT'S PCP AND THEY STATED THAT PATIENT HAS DISCONTINUED THE LORAZAPAM WHILE SHE IS TAKING THE PERCOCET.  I LET MELINDA KNOW THAT WE ARE AWARE THAT PATIENT RECEIVED A PERCOCET PRESCRIPTION FROM HER PCP EARLIER THIS WEEK AND THAT DR. SIMPSON IS TAKING OVER HER PAIN MEDICATION THERAPY AND THAT PATIENT IS S/P TOTAL KNEE REPLACEMENT

## 2019-04-09 ENCOUNTER — TELEPHONE (OUTPATIENT)
Dept: ORTHOPEDIC SURGERY | Facility: CLINIC | Age: 69
End: 2019-04-09

## 2019-04-09 NOTE — TELEPHONE ENCOUNTER
MARZENA WITH [facility] CALLED TO SEE IF PATIENTS STAPLES COULD BE REMOVED TODAY (14 DAYS AFTER SURGERY)     PER IGNACIA MOON ITS OK TO REMOVE EVERY OTHER STAPLE TODAY. DO NOT APPLY STERI STRIPS.    IN ANOTHER WEEK REMOVE THE REST.     MARZENA INDICATED SHE UNDERSTOOD THE INSTRUCTIONS.

## 2019-04-15 DIAGNOSIS — Z96.652 S/P TOTAL KNEE ARTHROPLASTY, LEFT: Primary | ICD-10-CM

## 2019-04-15 RX ORDER — HYDROCODONE BITARTRATE AND ACETAMINOPHEN 5; 325 MG/1; MG/1
1 TABLET ORAL EVERY 6 HOURS PRN
Qty: 40 TABLET | Refills: 0 | Status: SHIPPED | OUTPATIENT
Start: 2019-04-15 | End: 2021-04-12 | Stop reason: SDUPTHER

## 2019-04-15 NOTE — TELEPHONE ENCOUNTER
Patient is requesting a refill of Norco 5/325MG she was previously taking Percocet.     KOF. Please Advise     L TKA SX 3/25/19     [facility]

## 2019-05-08 NOTE — PROGRESS NOTES
POST OP TOTAL GLOBAL      NAME: Ronit Sarkar    : 1950    MRN: [id number]  ?  Chief Complaint   Patient presents with   • Left Knee - Post-op       Date of Surgery: 3/25/2019  ?  HPI:   Patient returns today for 6 week(s) follow up of left total knee arthroplasty. Incision(s) healed nicely with no signs of infection. Patient reports doing well with no unusual complaints. No fevers, rigors or chills. Appears to be progressing appropriately.  Patient states she has had some other health issues come about since having her knee replacement and it has put her behind on physical therapy.  She is alternating use of a cane and walker as needed for ambulation assistance.  She is currently participating in physical therapy with home health and making good progress.  She states she is began to have a shortness of breath over the last several days but reports significant worsening over the last 1 day.  She complains of getting winded fairly easily but feels like it could be related to allergens.  She did complete the total anticoagulant course of Xarelto.  She states that shortness of breath      Review of Systems:      Ortho Exam:   Left knee.The patient is status post total knee arthroplasty postoperative 6 week(s). Incision is clean. Calf is soft and nontender. Homans sign is negative. There is no clicking, popping or catching. Anterior and posterior drawer signs are negative.  There is no instability of the components. Appropriate amounts of swelling and bruising are noted. Dorsalis pedis and posterior tibial artery pulses are palpable. Common peroneal nerve function is well preserved. Range of motion is from 5-100 degrees of flexion. Gait is cautious but otherwise fairly normal. There is no evidence of a deep seated joint infection.      Diagnostic Studies:  None today, will perform x-rays at next visit as we were unable to do so today      Assessment:  Ronit was seen today for post-op.    Diagnoses and all  orders for this visit:    S/P total knee arthroplasty, left        Plan   · Encouraged patient to be seen in the emergency room today for evaluation of the shortness of breath to rule out pulmonary embolus.  Patient and daughter both understood and plan to go straight to the emergency room.  · Incision care  · To use ACE wrap/JORGE stocking  · Continue ice to joint   · Stretching and strengthening exercises  · ROM: Aggressive  · Falls precautions  · Continue with lifelong antibiotic prophylaxis with dental procedures following total joint replacement.  · Follow up in 4-5 week(s) with Dr. Maloney and x-rays      Danny Aguayo PA-C  05/10/2019

## 2019-05-10 ENCOUNTER — OFFICE VISIT (OUTPATIENT)
Dept: ORTHOPEDIC SURGERY | Facility: CLINIC | Age: 69
End: 2019-05-10

## 2019-05-10 VITALS — WEIGHT: 185 LBS | BODY MASS INDEX: 32.78 KG/M2 | HEIGHT: 63 IN

## 2019-05-10 DIAGNOSIS — Z96.652 S/P TOTAL KNEE ARTHROPLASTY, LEFT: Primary | ICD-10-CM

## 2019-05-10 PROCEDURE — 99024 POSTOP FOLLOW-UP VISIT: CPT | Performed by: PHYSICIAN ASSISTANT

## 2019-05-13 ENCOUNTER — TELEPHONE (OUTPATIENT)
Dept: ORTHOPEDIC SURGERY | Facility: CLINIC | Age: 69
End: 2019-05-13

## 2019-05-13 NOTE — TELEPHONE ENCOUNTER
DAREN WITH [facility] CALLED AND STATED THAT PATIENT'S ORDERS ARE UP AT THE END OF THE WEEK AND THE PLAN IS TO DISCHARGE PATIENT.  SHE STATED THAT THEY HAVE HAD A DIFFICULT TIME CATCHING PATIENT AT HOME FOR HER PHYSICAL THERAPY AND PATIENT HAS BEEN NON COMPLIANT REGARDING THE THERAPY THEY HAVE ASKED HER TO DO AT HOME.  PATIENT IS S/P LEFT TOTAL KNEE REPLACEMENT ON 3/25/19.  PLEASE ADVISE

## 2019-05-13 NOTE — TELEPHONE ENCOUNTER
Let her complete her physical therapy this week at home.  Please send in a note for outpatient physical therapy to follow the total knee arthroplasty protocols.  She can have this PT at any 1 of the local physical therapy outfits in [facility].  Thank you

## 2019-05-14 NOTE — TELEPHONE ENCOUNTER
Called the patient to see where she would like to go to outpatient physical therapy the patient states if she can not do physical therapy at home with home health then she is not going to do outpatient therapy at all.     The patient states therapy is coming 3 times a week M,W & F. But yesterday the patient told home health PT not to come because she wasn't feeling well because she has been in and out of the hospital with pneumonia over the past week and weekend.    I left a voicemail for Ximena with home health to return my call to see if they will extend her home health.

## 2019-05-14 NOTE — TELEPHONE ENCOUNTER
DAVID WITH [facility] RETURNED JEB'S CALL AND I WENT OVER DR. SIMPSON'S RECOMMENDATIONS ALONG WITH JEB'S CONVERSATION WITH PATIENT.  DAVID WILL CONTACT PATIENT TO SEE IF SHE WILL AGREE TO BE MORE COMPLIANT WITH HER THERAPY AND ALSO SPEAK WITH THE COORDINATING THERAPIST.  DAVID WILL GIVE US A CALL BACK TO LET US KNOW IF THEY CAN EXTEND HOME HEALTH FOR PHYSICAL THERAPY

## 2019-05-14 NOTE — TELEPHONE ENCOUNTER
DAVID WITH [facility] CALLED AND STATED THAT THEY WILL SEE PATIENT FOR 4 MORE WEEKS FOR PHYSICAL THERAPY .

## 2019-05-22 ENCOUNTER — TELEPHONE (OUTPATIENT)
Dept: ORTHOPEDIC SURGERY | Facility: CLINIC | Age: 69
End: 2019-05-22

## 2019-05-22 NOTE — TELEPHONE ENCOUNTER
PT HAD TKA ON 3/25/19. THE LAST FEW NIGHTS SHE HAS BEEN UNABLE TO SLEEP DUE TO PAIN IN BOTH  LEGS AND RESTLESS LEG FEELING.     SHE STATES SHE HAS NOT SLEPT AT ALL. SHE FEELS LIKE THIS HAS STARTED SINCE SHE IS MORE MOBILE AND DOING MORE SINCE SURGERY.     PLEASE ADVISE

## 2019-05-23 NOTE — TELEPHONE ENCOUNTER
Yes I agree that her increased mobility after surgery is possibly causing her to have leg pain and restlessness.  My recommendation would be for her to call her PCP and see if they could give her a prescription of some Lyrica to help on a short-term basis.

## 2019-06-13 ENCOUNTER — OFFICE VISIT (OUTPATIENT)
Dept: ORTHOPEDIC SURGERY | Facility: CLINIC | Age: 69
End: 2019-06-13

## 2019-06-13 DIAGNOSIS — Z96.652 S/P TOTAL KNEE ARTHROPLASTY, LEFT: ICD-10-CM

## 2019-06-13 DIAGNOSIS — Z96.652 HISTORY OF ARTHROPLASTY OF LEFT KNEE: Primary | ICD-10-CM

## 2019-06-13 PROCEDURE — 73560 X-RAY EXAM OF KNEE 1 OR 2: CPT | Performed by: ORTHOPAEDIC SURGERY

## 2019-06-13 PROCEDURE — 99024 POSTOP FOLLOW-UP VISIT: CPT | Performed by: ORTHOPAEDIC SURGERY

## 2019-06-13 NOTE — PROGRESS NOTES
POST OP TOTAL GLOBAL      NAME: Ronit Sarkar    : 1950    MRN: [id number]  ?  Chief Complaint   Patient presents with   • Left Knee - Follow-up       Date of Surgery: Patient underwent left total knee arthroplasty on 2019.  ?  HPI:   Patient returns today for 2 months and 3 week(s) follow up of left total knee arthroplasty. Incision(s) healed nicely with no signs of infection. Patient reports doing well with no unusual complaints. No fevers, rigors or chills. Appears to be progressing appropriately. Patient is on appropriate anticoagulation.  The patient is actually doing quite well with postoperative progress.  Her range of motion is improved significantly with physical therapy.  She is not using any narcotic medication for pain control.  She is very pleased with her postoperative outcome.      Review of Systems:      Ortho Exam:   Left knee.The patient is status post total knee arthroplasty postoperative 2 months and 3 week(s). Incision is clean. Calf is soft and nontender. Homans sign is negative. There is no clicking, popping or catching. Anterior and posterior drawer signs are negative.  There is no instability of the components. Appropriate amounts of swelling and bruising are noted. Dorsalis pedis and posterior tibial artery pulses are palpable. Common peroneal nerve function is well preserved. Range of motion is from 0-125 degrees of flexion. Gait is cautious but otherwise fairly normal. There is no evidence of a deep seated joint infection.      Diagnostic Studies:left Knee X-Ray  Indication: Evaluation of implant position after total knee arthroplasty.  AP, Lateral views  Findings: Anatomically well-placed implant with a good cement mantle without any subsidence of the implants.  no bony lesion  Soft tissues within normal limits  within normal limits joint spaces  Hardware appropriately positioned yes      yes prior studies available for comparison.    This patient's x-ray report was  graded according to the Kellgren and Hussein classification.  This took into account the joint space narrowing, osteophyte formation, sclerosis of the distal femur/proximal tibia along with deformity of those bones.  The findings were indicative of K L grade NA.    X-RAY was ordered and reviewed by Robbin Maloney MD        Assessment:  Ronit was seen today for follow-up.    Diagnoses and all orders for this visit:    History of arthroplasty of left knee  -     XR Knee 1 or 2 View Left    S/P total knee arthroplasty, left            Plan   · Incision care  · To use ACE wrap/JORGE stocking  · Continue ice to joint   · Stretching and strengthening exercises quads and the hamstrings.  · Discussed with the patient about continue with aggressive physical therapy to follow the total knee arthroplasty protocols.  · Aggressive ROM  · Falls precautions  · You may now resume any prescription medications you were taking prior to surgery  · Continue with lifelong antibiotic prophylaxis with dental procedures following total joint replacement.  · Follow up in 6 month(s)    Robbin Maloney MD  06/13/2019

## 2019-10-04 ENCOUNTER — HOSPITAL ENCOUNTER (OUTPATIENT)
Dept: SURGERY | Facility: HOSPITAL | Age: 69
Setting detail: HOSPITAL OUTPATIENT SURGERY
Discharge: HOME | End: 2019-10-04
Attending: OPHTHALMOLOGY

## 2019-10-04 LAB — GLUCOSE BLD-MCNC: 162 MG/DL (ref 65–99)

## 2019-10-18 ENCOUNTER — HOSPITAL ENCOUNTER (OUTPATIENT)
Dept: SURGERY | Facility: HOSPITAL | Age: 69
Setting detail: HOSPITAL OUTPATIENT SURGERY
Discharge: HOME | End: 2019-10-18
Attending: OPHTHALMOLOGY

## 2019-10-18 LAB — GLUCOSE BLD-MCNC: 158 MG/DL (ref 65–99)

## 2019-12-12 ENCOUNTER — OFFICE VISIT (OUTPATIENT)
Dept: ORTHOPEDIC SURGERY | Facility: CLINIC | Age: 69
End: 2019-12-12

## 2019-12-12 VITALS — HEIGHT: 63 IN | BODY MASS INDEX: 33.06 KG/M2 | WEIGHT: 186.6 LBS | TEMPERATURE: 97.5 F

## 2019-12-12 DIAGNOSIS — Z96.652 S/P TOTAL KNEE ARTHROPLASTY, LEFT: ICD-10-CM

## 2019-12-12 DIAGNOSIS — Z96.652 HISTORY OF LEFT KNEE REPLACEMENT: Primary | ICD-10-CM

## 2019-12-12 PROCEDURE — 73560 X-RAY EXAM OF KNEE 1 OR 2: CPT | Performed by: ORTHOPAEDIC SURGERY

## 2019-12-12 PROCEDURE — 99213 OFFICE O/P EST LOW 20 MIN: CPT | Performed by: ORTHOPAEDIC SURGERY

## 2019-12-12 RX ORDER — MELOXICAM 7.5 MG/1
7.5 TABLET ORAL DAILY
Qty: 90 TABLET | Refills: 1 | Status: SHIPPED | OUTPATIENT
Start: 2019-12-12 | End: 2020-09-08

## 2019-12-16 NOTE — PROGRESS NOTES
POST OP TOTAL replacement follow-up visit      NAME: Ronit Sarkar           : 1950    MRN: [id number]    Chief Complaint   Patient presents with   • Left Knee - Follow-up       Date of Surgery: The patient underwent left total knee arthroplasty on 2019.  ?  HPI:   Patient returns today for 9 month(s) follow up of left total knee arthroplasty. Incision(s) healed nicely with no signs of infection. Patient reports doing well with no unusual complaints. No fevers, rigors or chills. Appears to be progressing appropriately. Patient is on appropriate anticoagulation.  He has done extremely well post knee replacement surgery.  Range of motion of the knee has improved significantly with physical therapy.  She is able to walk for exercise without any pain or discomfort.  She states that her quality of life has improved significantly.  She does have mild pain and tenderness of the knee and basically has been using Mobic to help manage her symptoms which are very well controlled.  The patient states that this particular surgery improved her quality of life significantly for the better.      Review of Systems   Constitutional: Negative.  Negative for fever.   HENT: Negative.    Eyes: Negative.    Respiratory: Negative.    Cardiovascular: Negative.    Gastrointestinal: Negative.    Endocrine: Negative.    Genitourinary: Negative.    Musculoskeletal: Positive for arthralgias, gait problem and joint swelling.   Skin: Negative.  Negative for rash and wound.   Allergic/Immunologic: Negative.    Neurological: Negative for numbness.   Hematological: Negative.    Psychiatric/Behavioral: Negative.    Physical exam:   Constitutional: Patient is oriented to person, place, and time. Appears well-developed and well-nourished.   HENT:   Head: Normocephalic and atraumatic.   Eyes: Conjunctivae and EOM are normal. Pupils are equal, round, and reactive to light.   Cardiovascular: Normal rate, regular rhythm, normal heart  sounds and intact distal pulses.   Pulmonary/Chest: Effort normal and breath sounds normal.   Musculoskeletal:   See detailed exam below   Neurological: Alert and oriented to person, place, and time. No sensory deficit. Coordination normal.   Skin: Skin is warm and dry. Capillary refill takes less than 2 seconds. No rash noted. No erythema.   Psychiatric: Patient has a normal mood and affect. Her behavior is normal. Judgment and thought content normal.   Nursing note and vitals reviewed.  Ortho Exam:   Left knee.The patient is status post total knee arthroplasty postoperative 9 month(s). Incision is clean. Calf is soft and nontender. Homans sign is negative. There is no clicking, popping or catching. Anterior and posterior drawer signs are negative.  There is no instability of the components. Appropriate amounts of swelling and bruising are noted. Dorsalis pedis and posterior tibial artery pulses are palpable. Common peroneal nerve function is well preserved. Range of motion is from 0-130 degrees of flexion. Gait is cautious but otherwise fairly normal. There is no evidence of a deep seated joint infection.      Diagnostic Studies:  xrays obtained today  left Knee X-Ray  Indication: EValuation of implant position after total knee arthroplasty.  AP, Lateral views  Findings: Well-placed implants with a good cement mantle without any subsidence.  no bony lesion  Soft tissues within normal limits  within normal limits joint spaces  Hardware appropriately positioned yes      yes prior studies available for comparison.    This patient's x-ray report was graded according to the Kellgren and Hussein classification.  This took into account the joint space narrowing, osteophyte formation, sclerosis of the distal femur/proximal tibia along with deformity of those bones.  The findings were indicative of K L grade n/a.    X-RAY was ordered and reviewed by Robbin Maloney MD      Assessment:  Ronit was seen today for  follow-up.    Diagnoses and all orders for this visit:    History of left knee replacement  -     XR Knee 1 or 2 View Left    S/P total knee arthroplasty, left    Other orders  -     meloxicam (MOBIC) 7.5 MG tablet; Take 1 tablet by mouth Daily.            Plan   · Incision care  · To use ACE wrap/JORGE stocking  · Continue ice to joint   · Stretching and strengthening exercises  · Aggressive ROM of the flexors and extensors of the knee.  · Calcium and vitamin D for bone health.  · Glucosamine, chondroitin and turmeric for cartilage health.  · Tablet Mobic 7.5 mg tab 1 p.o. daily for pain swelling and inflammation.  · Falls precautions  · You may now resume any prescription medications you were taking prior to surgery  · Continue with lifelong antibiotic prophylaxis with dental procedures following total joint replacement.  · Follow up in 1 year(s)    Date of encounter: 12/12/2019   Robbin Maloney MD

## 2020-06-25 ENCOUNTER — OFFICE VISIT CONVERTED (OUTPATIENT)
Dept: ORTHOPEDIC SURGERY | Facility: CLINIC | Age: 70
End: 2020-06-25
Attending: ORTHOPAEDIC SURGERY

## 2020-07-20 ENCOUNTER — HOSPITAL ENCOUNTER (OUTPATIENT)
Dept: OTHER | Facility: HOSPITAL | Age: 70
Discharge: HOME | End: 2020-07-20
Attending: ORTHOPAEDIC SURGERY

## 2020-07-20 ENCOUNTER — OFFICE VISIT (OUTPATIENT)
Dept: ORTHOPEDIC SURGERY | Facility: CLINIC | Age: 70
End: 2020-07-20
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
## 2020-07-20 DIAGNOSIS — Z96.652 S/P TOTAL KNEE ARTHROPLASTY, LEFT: Primary | ICD-10-CM

## 2020-07-20 DIAGNOSIS — Z96.652 HISTORY OF LEFT KNEE REPLACEMENT: Primary | ICD-10-CM

## 2020-07-20 PROCEDURE — 99213 OFFICE O/P EST LOW 20 MIN: CPT | Performed by: ORTHOPAEDIC SURGERY

## 2020-07-20 NOTE — PROGRESS NOTES
"FOLLOW UP VISIT    Patient: Ronit Sarkar  ?  YOB: 1950    MRN: [id number]  ?  Chief Complaint   Patient presents with   • Left Knee - Fall      ?  HPI: Returns back for follow-up on left total knee arthroplasty.  She has done very well with the knee replacement surgery performed by me 2 years ago.  Unfortunately she fell on 3 July 2020.  She sustained a direct impact to the anterior aspect of the knee.  She has had a mild limp since that day.  She was a little bit concerned about damage to the prosthesis.  She was seen by her PCP who then recommended that she should come see us.  We have gone ahead and gotten an x-ray which shows the implants to be in good position.  No periprosthetic fractures are noted.  The patient does not have an extensor lag and does not demonstrate any evidence of disruption of the patellar tendon or the quadriceps tendon.  SHe does complain of a slight \"knot \"over the anterior aspect of the knee which is most likely scar tissue in this location.    Pain Location: left knee(s)  Radiation: none  Quality: sharp, stabbing  Intensity/Severity: moderate  Duration: 2 weeks  Onset quality: sudden  Timing: intermittent  Aggravating Factors: going up and down stairs, kneeling and squatting  Alleviating Factors: ambulating and exercise  Previous Episodes: yes  Associated Symptoms: pain, swelling  ADL Affected: ambulating  Previous Treatment: brace    This patient is an established patient.  This problem is not new to this examiner.      Allergies:   Allergies   Allergen Reactions   • Latex    • Sulfa Antibiotics        Medications:   Home Medications:  Current Outpatient Medications on File Prior to Visit   Medication Sig   • acetaminophen-codeine (TYLENOL #3) 300-30 MG per tablet    • atenolol (TENORMIN) 25 MG tablet    • gabapentin (NEURONTIN) 100 MG capsule    • HYDROcodone-acetaminophen (NORCO) 5-325 MG per tablet Take 1 tablet by mouth Every 6 (Six) Hours As Needed for Mild " "Pain  or Moderate Pain .   • JANUVIA 100 MG tablet    • levothyroxine (SYNTHROID, LEVOTHROID) 88 MCG tablet    • losartan (COZAAR) 100 MG tablet    • meloxicam (MOBIC) 7.5 MG tablet Take 1 tablet by mouth Daily.   • oxyCODONE-acetaminophen (PERCOCET) 5-325 MG per tablet Take one by mouth every 4-6 hours prn pain   • pravastatin (PRAVACHOL) 20 MG tablet      No current facility-administered medications on file prior to visit.      Current Medications:  Scheduled Meds:  PRN Meds:.    I have reviewed the patient's medical history in detail and updated the computerized patient record.  Review and summarization of old records include:    Past Medical History:   Diagnosis Date   • Diabetes (CMS/HCC)    • Fracture of wrist    • Hypertension    • Pain in both knees 2018   • Primary osteoarthritis of left knee 2016   • Thyroid activity decreased      Past Surgical History:   Procedure Laterality Date   •  SECTION       Social History     Occupational History   • Not on file   Tobacco Use   • Smoking status: Never Smoker   Substance and Sexual Activity   • Alcohol use: No   • Drug use: Not on file   • Sexual activity: Not on file      Social History     Social History Narrative   • Not on file     Family History   Problem Relation Age of Onset   • Heart disease Father          Review of Systems  Constitutional: Negative for appetite change.   HENT: Negative.    Eyes: Negative.    Respiratory: Negative.    Cardiovascular: Negative.    Gastrointestinal: Negative.    Endocrine: Negative.    Genitourinary: Negative.    Musculoskeletal: See details of exam below.  Skin: Negative.    Allergic/Immunologic: Negative.    Hematological: Negative.    Psychiatric/Behavioral: Negative.         Wt Readings from Last 3 Encounters:   19 84.6 kg (186 lb 9.6 oz)   05/10/19 83.9 kg (185 lb)   19 93 kg (205 lb)     Ht Readings from Last 3 Encounters:   19 160 cm (63\")   05/10/19 158.8 cm (62.5\")   19 " "157.5 cm (62\")     There is no height or weight on file to calculate BMI.  No height and weight on file for this encounter.  There were no vitals filed for this visit.      Physical Exam  Constitutional: Patient is oriented to person, place, and time. Appears well-developed and well-nourished.   HENT:   Head: Normocephalic and atraumatic.   Eyes: Conjunctivae and EOM are normal. Pupils are equal, round, and reactive to light.   Cardiovascular: Normal rate, regular rhythm, normal heart sounds and intact distal pulses.   Pulmonary/Chest: Effort normal and breath sounds normal.   Musculoskeletal:   See detailed exam below   Neurological: Alert and oriented to person, place, and time. No sensory deficit. Coordination normal.   Skin: Skin is warm and dry. Capillary refill takes less than 2 seconds. No rash noted. No erythema.   Psychiatric: Patient has a normal mood and affect. Her behavior is normal. Judgment and thought content normal.   Nursing note and vitals reviewed.      Ortho Exam:     Left knee.The patient is status post total knee arthroplasty postoperative 2 year(s). Incision is clean. Calf is soft and nontender. Homans sign is negative. There is no clicking, popping or catching. Anterior and posterior drawer signs are negative.  There is no instability of the components. Appropriate amounts of swelling and bruising are noted. Dorsalis pedis and posterior tibial artery pulses are palpable. Common peroneal nerve function is well preserved. Range of motion is from 0-125 degrees of flexion. Gait is cautious but otherwise fairly normal. There is no evidence of a deep seated joint infection.          Diagnostics:  Left knee xrays AP/LATERAL views were ordered by Robbin Maloney MD and performed at [facility]. These images were independently viewed and interpreted by myself, my impression as follows:   left Knee X-Ray  Indication: evaluation of implant position AFTER tka  AP, Lateral " views  Findings: wELL PLACED IMPLANTS WITH A GOOD CEMENT MANTLE AND NO PERIPROSTHETIC FRACTURES  no bony lesion  Soft tissues within normal limits  within normal limits joint spaces  Hardware appropriately positioned yes      yes prior studies available for comparison.    X-RAY was ordered and reviewed by Robbin Maloney MD    Assessment:  Ronit was seen today for fall.    Diagnoses and all orders for this visit:    S/P total knee arthroplasty, left    Fall, initial encounter          Procedures  ?    Plan    · Compression/brace to the knee to prevent her from buckling and giving out.  · , GI and dental procedure prophylaxis with antibiotics to prevent metastatic infection to the knee arthroplasty implants.  · Calcium and vitamin D for bone health.  · Glucosamine, chondroitin and turmeric for cartilage health.  · Rest, ice, compression, and elevation (RICE) therapy  · Stretching and strengthening exercises of the quads and the hamstrings.  · OTC Alternate Ibuprofen and Tylenol as needed  · Follow up in 1 year(s)    Date of encounter: 07/20/2020   Robbin Maloney MD

## 2020-09-08 RX ORDER — MELOXICAM 7.5 MG/1
TABLET ORAL
Qty: 90 TABLET | Refills: 0 | Status: SHIPPED | OUTPATIENT
Start: 2020-09-08

## 2021-04-01 ENCOUNTER — OFFICE VISIT (OUTPATIENT)
Dept: ORTHOPEDIC SURGERY | Facility: CLINIC | Age: 71
End: 2021-04-01

## 2021-04-01 VITALS — HEIGHT: 63 IN | WEIGHT: 186 LBS | TEMPERATURE: 96.2 F | BODY MASS INDEX: 32.96 KG/M2

## 2021-04-01 DIAGNOSIS — S89.92XA LEFT KNEE INJURY, INITIAL ENCOUNTER: ICD-10-CM

## 2021-04-01 DIAGNOSIS — Z96.652 S/P TOTAL KNEE ARTHROPLASTY, LEFT: Primary | ICD-10-CM

## 2021-04-01 PROCEDURE — 99213 OFFICE O/P EST LOW 20 MIN: CPT | Performed by: ORTHOPAEDIC SURGERY

## 2021-04-01 RX ORDER — LEVOTHYROXINE SODIUM 112 UG/1
112 TABLET ORAL DAILY
COMMUNITY
Start: 2021-02-26

## 2021-04-01 RX ORDER — EZETIMIBE 10 MG/1
TABLET ORAL
COMMUNITY
Start: 2021-01-14

## 2021-04-01 RX ORDER — CEPHALEXIN 500 MG/1
CAPSULE ORAL
COMMUNITY
Start: 2021-03-30

## 2021-04-01 NOTE — PROGRESS NOTES
"Chief Complaint  Pain and Knee Injury of the Left Knee    Subjective    History of Present Illness      Ronit Sarkar is a 70 y.o. female who presents to [facility] for follow-up on a left total knee arthroplasty performed by me on 25 March 2019.  History of Present Illness the patient is now 2 years post total knee arthroplasty on the left side.  She has done extremely well with that knee replacement surgery.  She does not have any pain or discomfort that requires pain medication.  She has improved her ambulation significantly for the better.  She states that she was ambulating with a walker this past Saturday at a [place] in [place] when she fell.  She was seen at [facility] and told that there was an avulsion fracture to the medial side of the knee.  There was a question about a bony avulsion as well.  We have gone back and compared her films to the immediate postop films and we feel that this is most likely a chronic finding on the x-rays.  She does not have any instability of the knee as such.  She states that once the swelling and bruising is settled down she is doing a lot better.  She had recently developed a urinary tract infection and is being treated for that condition as well.  Pain Location:  LEFT knee  Radiation: none  Quality: aching, sharp  Intensity/Severity: mild-moderate  Duration: since 03/27/2021  Progression of symptoms: no worsening, symptoms stable/unchanged  Onset quality: sudden after a fall  Timing: intermittent  Aggravating Factors: kneeling, squatting  Alleviating Factors: NSAIDs, rest, brace, ice  Previous Episodes: none mentioned  Associated Symptoms: swelling  ADLs Affected: ambulating  Previous Treatment: NSAIDs       Objective   Vital Signs:   Temp 96.2 °F (35.7 °C)   Ht 160 cm (63\")   Wt 84.4 kg (186 lb)   BMI 32.95 kg/m²     Physical Exam  Physical Exam  Vitals signs and nursing note reviewed.   Constitutional:       Appearance: " Normal appearance.   Pulmonary:      Effort: Pulmonary effort is normal.   Skin:     General: Skin is warm and dry.      Capillary Refill: Capillary refill takes less than 2 seconds.   Neurological:      General: No focal deficit present.      Mental Status: He is alert and oriented to person, place, and time. Mental status is at baseline.   Psychiatric:         Mood and Affect: Mood normal.         Behavior: Behavior normal.         Thought Content: Thought content normal.         Judgment: Judgment normal.     Ortho Exam     Left knee.The patient is status post total knee arthroplasty postoperative 2 year(s). Incision is clean. Calf is soft and nontender. Homans sign is negative. There is no clicking, popping or catching. Anterior and posterior drawer signs are negative.  There is no instability of the components. Appropriate amounts of swelling and bruising are noted. Dorsalis pedis and posterior tibial artery pulses are palpable. Common peroneal nerve function is well preserved. Range of motion is from 0-110 degrees of flexion. Gait is cautious but otherwise fairly normal. There is no evidence of a deep seated joint infection.        Result Review :   The following data was reviewed by: Robbin Maloney MD on 04/01/2021:  Radiologic studies - see below for interpretation  no diagnostic testing performed this visit  X-rays obtained at [facility] are reviewed.  There is a small bony osteophyte on the medial aspect of the knee in the vicinity of the medial epicondyle of the femur.  This is a finding that was present even 2 years ago on her postop films.  This does not represent a new bony change or fracture.  I have discussed that with the patient and her daughter at length and they understand and accept that.  Other than that the knee replacement is in excellent position with a good cement mantle.          Procedures           Assessment   Assessment and Plan    Diagnoses and all orders for this  visit:    1. S/P total knee arthroplasty, left (Primary)  -     XR Knee 1 or 2 View Left; Future    2. Left knee injury, initial encounter  -     XR Knee 1 or 2 View Left; Future          Follow Up   · Compression/brace to the knee to protect the MCL if she does have a grade 1-2 sprain of the medial collateral ligament.  · Walk with a walker.  · Falls precaution.  · Tablet calcium and vitamin D tab 1 p.o. twice daily for bone health.  · Rest, ice, compression, and elevation (RICE) therapy  · Stretching and strengthening exercises  · OTC Alternate Ibuprofen and Tylenol as needed  · Follow up in 6 week(s) for reevaluation.  • Patient was given instructions and counseling regarding her condition or for health maintenance advice. Please see specific information pulled into the AVS if appropriate.     Robbin aMloney MD   Date of Encounter: 4/1/2021       EMR Dragon/Transcription disclaimer:  Much of this encounter note is an electronic transcription/translation of spoken language to printed text. The electronic translation of spoken language may permit erroneous, or at times, nonsensical words or phrases to be inadvertently transcribed; Although I have reviewed the note for such errors, some may still exist.

## 2021-04-08 ENCOUNTER — TELEPHONE (OUTPATIENT)
Dept: ORTHOPEDIC SURGERY | Facility: CLINIC | Age: 71
End: 2021-04-08

## 2021-04-08 NOTE — TELEPHONE ENCOUNTER
PATIENTS DAUGHTER CALLED FOR HEIDI WHO HAD FALLEN AGAIN TODAY. SHE STATES THE KNEE HURTS WORSE NOW THAN Monday.    DR SIMPSON ADVISED THE PATIENT TO GO TO THE ER SINCE CURRENTLY XRAYS ARE BEING DONE @ [facility] AND IT WOULD BE A LONG WAIT FOR PATENT BEFORE EVEN  BEING SEEN.    PATIENT UNDERSTOOD AND WILL CONTACT US ONCE XRAY ARE OBTAINED

## 2021-04-12 ENCOUNTER — OFFICE VISIT (OUTPATIENT)
Dept: ORTHOPEDIC SURGERY | Facility: CLINIC | Age: 71
End: 2021-04-12

## 2021-04-12 VITALS — BODY MASS INDEX: 32.2 KG/M2 | TEMPERATURE: 98.3 F | WEIGHT: 175 LBS | HEIGHT: 62 IN

## 2021-04-12 DIAGNOSIS — S72.409A CLOSED FRACTURE OF DISTAL END OF FEMUR, UNSPECIFIED FRACTURE MORPHOLOGY, INITIAL ENCOUNTER (HCC): ICD-10-CM

## 2021-04-12 DIAGNOSIS — Z96.652 S/P TOTAL KNEE ARTHROPLASTY, LEFT: Primary | ICD-10-CM

## 2021-04-12 DIAGNOSIS — Z96.652 S/P TOTAL KNEE ARTHROPLASTY, LEFT: ICD-10-CM

## 2021-04-12 PROCEDURE — 99214 OFFICE O/P EST MOD 30 MIN: CPT | Performed by: ORTHOPAEDIC SURGERY

## 2021-04-12 PROCEDURE — 27508 TREATMENT OF THIGH FRACTURE: CPT | Performed by: ORTHOPAEDIC SURGERY

## 2021-04-12 RX ORDER — OMEPRAZOLE 40 MG/1
CAPSULE, DELAYED RELEASE ORAL
COMMUNITY

## 2021-04-12 RX ORDER — CELECOXIB 100 MG/1
CAPSULE ORAL
COMMUNITY
End: 2021-06-07 | Stop reason: SDUPTHER

## 2021-04-12 RX ORDER — DULOXETIN HYDROCHLORIDE 30 MG/1
CAPSULE, DELAYED RELEASE ORAL
COMMUNITY

## 2021-04-12 RX ORDER — HYDROCODONE BITARTRATE AND ACETAMINOPHEN 5; 325 MG/1; MG/1
1 TABLET ORAL EVERY 12 HOURS PRN
Qty: 30 TABLET | Refills: 0 | Status: SHIPPED | OUTPATIENT
Start: 2021-04-12

## 2021-04-12 RX ORDER — ROPINIROLE 0.25 MG/1
TABLET, FILM COATED ORAL
COMMUNITY

## 2021-04-12 NOTE — PROGRESS NOTES
"Chief Complaint  Pain of the Left Thigh    Subjective    History of Present Illness      Ronit Sarkar is a 70 y.o. female who presents to [facility] for injury to the left knee and distal femur.  History of Present Illness the patient is well-known to me from a prior left total knee arthroplasty performed by me 2 years ago.  She states that she was getting into her truck this past Wednesday when she slipped and fell.  She sustained a significant impact to the distal aspect of the left femur.  She was unable to bear any weight and was seen in the emergency room at the hospital in [place].  The patient was told that she has a distal femoral metaphyseal fracture just proximal to the femoral component of the total knee arthroplasty.  There was no displacement of this fracture.  Patient was placed in a knee immobilizer and sent to our office for further management.  The patient is present in the office today with her daughter.  She states to me very clearly \"I do not want any surgery because I have very bad lungs \".  Pain Location: LEFT femur  Radiation: none  Quality: crushing  Intensity/Severity: moderate to severe  Duration: since 03/27/2021  Progression of symptoms: no worsening, symptoms stable/unchanged  Onset quality: sudden after falling onto her knee while getting into a truck  Timing: intermittent  Aggravating Factors: walking, standing  Alleviating Factors: NSAIDs, rest, ice  Previous Episodes: none mentioned  Associated Symptoms: swelling  ADLs Affected: ambulating, recreational activities/sports  Previous Treatment: NSAIDs, brace and rest       Objective   Vital Signs:   Temp 98.3 °F (36.8 °C)   Ht 157.5 cm (62\")   Wt 79.4 kg (175 lb)   BMI 32.01 kg/m²     Physical Exam  Physical Exam  Vitals signs and nursing note reviewed.   Constitutional:       Appearance: Normal appearance.   Pulmonary:      Effort: Pulmonary effort is normal.   Skin:     General: Skin is warm " and dry.      Capillary Refill: Capillary refill takes less than 2 seconds.   Neurological:      General: No focal deficit present.      Mental Status: He is alert and oriented to person, place, and time. Mental status is at baseline.   Psychiatric:         Mood and Affect: Mood normal.         Behavior: Behavior normal.         Thought Content: Thought content normal.         Judgment: Judgment normal.     Ortho Exam     Left knee.The patient is status post total knee arthroplasty postoperative 2 year(s). Incision is clean. Calf is soft and nontender. Homans sign is negative. There is no clicking, popping or catching. Anterior and posterior drawer signs are negative.  There is no instability of the components. Appropriate amounts of swelling and bruising are noted. Dorsalis pedis and posterior tibial artery pulses are palpable. Common peroneal nerve function is well preserved. Range of motion is from 10-60 degrees of flexion. Gait is cautious but otherwise fairly normal. There is no evidence of a deep seated joint infection.  She has distinct tenderness and bruising over the distal aspect of the femur.  There is no question that she has clinically a distal femoral periprosthetic fracture.  The implant itself appears to be stable.            Result Review :   The following data was reviewed by: Robbin Maloney MD on 04/12/2021:  Radiologic studies - see below for interpretation  Reviewed CT report of left lower extremity, performed at [facility] on 04/08/2021, summary of impression below:  The CT scan report from the hospital has been reviewed by me.  These images are consistent with a nondisplaced periprosthetic fracture of the distal femoral metaphysis.  There is a linear lucency through the anteromedial and the lateral margins of the distal femoral metaphysis.  There is a large knee effusion.  Based on the fact that this is an nondisplaced fracture and the patient is 70 with very poor medical  condition I would recommend nonoperative management.  I have discussed that with the patient and her daughter in detail.          Procedures           Assessment   Assessment and Plan    Diagnoses and all orders for this visit:    1. S/P total knee arthroplasty, left (Primary)    2. Closed fracture of distal end of femur, unspecified fracture morphology, initial encounter (CMS/[facility])          Follow Up   · Hinged IROM brace to the left lower extremity. Locked.   · Rest, ice, compression, and elevation (RICE) therapy  · Stretching and strengthening exercises of the ankle and the hip to prevent DVT.  · If the nonoperative treatment fails to provide her with adequate relief of her symptoms or the fracture fails to heal or deformity progresses then she will be a candidate for a total distal femoral replacement.  The distal fragment is too small to be amenable to reasonable purchase for open reduction internal fixation.  She is not a candidate for revision knee arthroplasty because it has only been 2 years and her knee replacement was completely asymptomatic prior to the fall, the injury and the distal femoral fracture.  · OTC Alternate Ibuprofen and Tylenol as needed   · Prescription of Norco 5/325MG take one by mouth every 12 hours prn pain #30   · Follow up in 4-5 week(s)  • Patient was given instructions and counseling regarding her condition or for health maintenance advice. Please see specific information pulled into the AVS if appropriate.   • Controlled substance treatment options discussed in detail. Patient's signed consent to medical options on file. ANI form in chart.  The patient is being provided this narcotic prescription to address the acute medical condition that they are undergoing/experiencing at this time.  It is my medical and surgical assessment that more than 3 days of narcotic medication is necessary to help control the pain and discomfort that this patient is experiencing at this point.  Risks  of narcotic medication usage outlined.  Possibility of physical and psychological dependence and abuse, especially long term, emphasized to the patient.  I have explained to the patient that we will try to wean them off the narcotic medication as soon as possible and introduce non-narcotic modalities of pain control.  At this point in the patient's clinical spectrum, however, alternative available treatments are inadequate to control their pain and symptoms.  I have also discussed the possibility of random drug testing as well as pill counts to prevent misuse and misappropriation of the narcotic medications prescribed to the patient.    Robbin Maloney MD   Date of Encounter: 4/12/2021       EMR Dragon/Transcription disclaimer:  Much of this encounter note is an electronic transcription/translation of spoken language to printed text. The electronic translation of spoken language may permit erroneous, or at times, nonsensical words or phrases to be inadvertently transcribed; Although I have reviewed the note for such errors, some may still exist.

## 2021-04-19 PROBLEM — D86.2 SARCOIDOSIS OF LUNG WITH SARCOIDOSIS OF LYMPH NODES (HCC): Status: ACTIVE | Noted: 2021-02-24

## 2021-04-19 PROBLEM — I67.2 CEREBRAL ATHEROSCLEROSIS: Status: ACTIVE | Noted: 2021-02-24

## 2021-04-19 PROBLEM — H91.93 BILATERAL HEARING LOSS: Status: ACTIVE | Noted: 2021-02-24

## 2021-04-19 PROBLEM — E11.9 DIABETES MELLITUS (HCC): Status: ACTIVE | Noted: 2021-02-24

## 2021-04-19 PROBLEM — F32.A DEPRESSIVE DISORDER: Status: ACTIVE | Noted: 2021-02-24

## 2021-04-19 PROBLEM — J30.9 ALLERGIC RHINITIS: Status: ACTIVE | Noted: 2018-12-01

## 2021-04-19 PROBLEM — M51.9 DISORDER OF LUMBOSACRAL INTERVERTEBRAL DISC: Status: ACTIVE | Noted: 2021-02-24

## 2021-04-19 PROBLEM — J44.9 CHRONIC OBSTRUCTIVE LUNG DISEASE (HCC): Status: ACTIVE | Noted: 2021-02-24

## 2021-04-19 PROBLEM — K57.92 DIVERTICULITIS: Status: ACTIVE | Noted: 2018-11-19

## 2021-04-19 PROBLEM — C50.919 MALIGNANT TUMOR OF BREAST (HCC): Status: ACTIVE | Noted: 2021-02-24

## 2021-04-19 PROBLEM — F41.9 ANXIETY: Status: ACTIVE | Noted: 2021-02-24

## 2021-04-19 PROBLEM — M81.0 OSTEOPOROSIS: Status: ACTIVE | Noted: 2021-02-24

## 2021-04-19 PROBLEM — J02.9 ACUTE PHARYNGITIS: Status: ACTIVE | Noted: 2018-12-01

## 2021-04-19 PROBLEM — I73.9 PERIPHERAL VASCULAR DISEASE (HCC): Status: ACTIVE | Noted: 2021-02-24

## 2021-04-19 PROBLEM — J01.00 ACUTE MAXILLARY SINUSITIS: Status: ACTIVE | Noted: 2018-12-01

## 2021-04-19 PROBLEM — K57.30 DIVERTICULOSIS OF LARGE INTESTINE WITHOUT DIVERTICULITIS: Status: ACTIVE | Noted: 2021-02-24

## 2021-04-19 PROBLEM — I11.9 HYPERTENSIVE HEART DISEASE WITHOUT CONGESTIVE HEART FAILURE: Status: ACTIVE | Noted: 2021-02-24

## 2021-04-19 PROBLEM — J06.9 ACUTE UPPER RESPIRATORY INFECTION: Status: ACTIVE | Noted: 2018-12-01

## 2021-04-19 PROBLEM — E03.9 HYPOTHYROIDISM: Status: ACTIVE | Noted: 2021-02-24

## 2021-04-19 PROBLEM — K21.00 GASTRO-ESOPHAGEAL REFLUX DISEASE WITH ESOPHAGITIS: Status: ACTIVE | Noted: 2021-02-24

## 2021-04-19 PROBLEM — G25.9 MOVEMENT DISORDER: Status: ACTIVE | Noted: 2021-02-24

## 2021-04-19 PROBLEM — E78.5 HYPERLIPIDEMIA: Status: ACTIVE | Noted: 2021-02-24

## 2021-04-19 PROBLEM — E61.1 IRON DEFICIENCY: Status: ACTIVE | Noted: 2021-02-24

## 2021-04-21 PROBLEM — S72.409A CLOSED FRACTURE OF DISTAL END OF FEMUR, UNSPECIFIED FRACTURE MORPHOLOGY, INITIAL ENCOUNTER (HCC): Status: ACTIVE | Noted: 2021-04-21

## 2021-05-10 DIAGNOSIS — S72.409A CLOSED FRACTURE OF DISTAL END OF FEMUR, UNSPECIFIED FRACTURE MORPHOLOGY, INITIAL ENCOUNTER (HCC): Primary | ICD-10-CM

## 2021-05-10 NOTE — H&P
History and Physical      Patient Name: Ronit Sarkar   Patient ID: [id number]   Sex: Female   YOB: 1950        Visit Date: June 25, 2020    Provider: Lesli Izaguirre MD   Location: [facility]   Location Address: [address] [place] [address]  [id number]   Location Phone: [phone]          Chief Complaint  · Right Wrist Pain      History Of Present Illness  Ronit Sarkar is a 70 year old /White female who presents for right wrist pain. She has a history of an ORIF distal radius fracture sustained in 2014. Patient states mild pain at the wrist that radiates to the forearm and to the fingers. Patient denies any recent injury or trauma.       Past Medical History  Arthritis; Cancer; Chronic Obstructive Pulmonary Disease; Diabetes; Hyperlipemia; Hypertension; Limb Swelling; Lung disease; Reflux; Seasonal allergies; Shortness of Breath; Thyroid disorder         Past Surgical History  Artificial Joints/Limbs; Cesarian Section; Colonoscopy; Eye Implant; Gallbladder; Hysterectomy; Joint Surgery; Metal implants         Medication List  acetaminophen-codeine 300-30 mg oral tablet; albuterol sulfate 90 mcg/actuation inhalation HFA aerosol inhaler; alendronate 70 mg oral tablet; anastrozole 1 mg oral tablet; azelastine 0.05 % ophthalmic (eye) drops; duloxetine 60 mg oral capsule,delayed release(DR/EC); ezetimibe 10 mg oral tablet; fluconazole 150 mg oral tablet; gabapentin 300 mg oral capsule; levofloxacin 750 mg oral tablet; levothyroxine 88 mcg oral tablet; losartan 50 mg oral tablet; meloxicam 7.5 mg oral tablet; nystatin 100,000 unit/mL oral suspension; oseltamivir 75 mg oral capsule; oxycodone-acetaminophen 5-325 mg oral tablet; pioglitazone 15 mg oral tablet; prednisone 10 mg oral tablet; Voltaren 1 % topical gel         Allergy List  Adhesives; Latex Exam Gloves; SULFA (SULFONAMIDES)         Family Medical History  Stroke; Heart Disease; Cancer, Unspecified; Diabetes, unspecified  "type; Osteoporosis; Family history of Arthritis         Social History  Alcohol Use (Never); lives alone; Recreational Drug Use (Never); Tobacco (Never);          Review of Systems  · Constitutional  o Denies  o : fever, chills, weight loss  · Cardiovascular  o Admits  o : shortness of breath  o Denies  o : chest pain  · Gastrointestinal  o Denies  o : liver disease, heartburn, nausea, blood in stools  · Genitourinary  o Denies  o : painful urination, blood in urine  · Integument  o Denies  o : rash, itching  · Neurologic  o Admits  o : weakness  o Denies  o : headache, loss of consciousness  · Musculoskeletal  o Admits  o : painful, swollen joints  · Psychiatric  o Admits  o : depression  o Denies  o : drug/alcohol addiction, anxiety      Vitals  Date Time BP Position Site L\R Cuff Size HR RR TEMP (F) WT  HT  BMI kg/m2 BSA m2 O2 Sat HC       06/25/2020 10:21 AM      91 - R   184lbs 16oz 5'  2\" 33.84 1.92 91 %          Physical Examination  · Constitutional  o Appearance  o : well developed, well-nourished, no obvious deformities present  · Head and Face  o Head  o :   § Inspection  § : normocephalic  o Face  o :   § Inspection  § : no facial lesions  · Eyes  o Conjunctivae  o : conjunctivae normal  o Sclerae  o : sclerae white  · Ears, Nose, Mouth and Throat  o Ears  o :   § External Ears  § : appearance within normal limits  § Hearing  § : intact  o Nose  o :   § External Nose  § : appearance normal  · Neck  o Inspection/Palpation  o : normal appearance  o Range of Motion  o : full range of motion  · Respiratory  o Respiratory Effort  o : breathing unlabored  o Inspection of Chest  o : normal appearance  o Auscultation of Lungs  o : no audible wheezing or rales  · Cardiovascular  o Heart  o : regular rate  · Gastrointestinal  o Abdominal Examination  o : soft and non-tender  · Skin and Subcutaneous Tissue  o General Inspection  o : intact, no rashes  · Psychiatric  o General  o : Alert and oriented " x3  o Judgement and Insight  o : judgment and insight intact  o Mood and Affect  o : mood normal, affect appropriate  · Right Wrist  o Inspection  o : Scar is well healed. Able to make full fist. Mild crepitus of the flexor tendons. Neurovascularly grossly intact. Sensation grossly intact. 2+ radial and ulnar pulses.   · In Office Procedures  o View  o : AP/LATERAL  o Site  o : right wrist  o Indication  o : Right Wrist Pain  o Study  o : X-rays ordered, taken in the office, and reviewed today.  o Xray  o : Stable ORIF without loosening, mild osteoarthritis.   o Comparative Data  o : No comparative data found          Assessment  · Right wrist pain     719.43/M25.531  · Primary osteoarthritis of right wrist     715.13/M19.031  · Tendinitis of right wrist     727.05/M77.8      Plan  · Orders  o Wrist (Right) 2 views X-Ray [facility] ([id number]) - 719.43/M25.531 - 06/25/2020  · Medications  o Medications have been Reconciled  o Transition of Care or Provider Policy  · Instructions  o Reviewed the patient's Past Medical, Social, and Family history as well as the ROS at today's visit, no changes.  o Call or return if worsening symptoms.  o Voltaren gel. Hand therapy. Follow up as needed.  o This note was transcribed by Sarah hwang/kev.            Electronically Signed by: Sarah Mcelroy-, Other -Author on June 27, 2020 09:09:35 AM  Electronically Co-signed by: Sara Alonzo PA-C -Reviewer on June 29, 2020 08:04:22 AM  Electronically Co-signed by: Lesli Izaguirre MD -Reviewer on June 30, 2020 08:42:41 AM

## 2021-05-13 ENCOUNTER — HOSPITAL ENCOUNTER (OUTPATIENT)
Dept: OTHER | Facility: HOSPITAL | Age: 71
Discharge: HOME | End: 2021-05-13
Attending: ORTHOPAEDIC SURGERY

## 2021-05-13 ENCOUNTER — OFFICE VISIT (OUTPATIENT)
Dept: ORTHOPEDIC SURGERY | Facility: CLINIC | Age: 71
End: 2021-05-13

## 2021-05-13 VITALS — HEIGHT: 62 IN | WEIGHT: 185 LBS | BODY MASS INDEX: 34.04 KG/M2 | TEMPERATURE: 97.1 F

## 2021-05-13 DIAGNOSIS — S72.409A CLOSED FRACTURE OF DISTAL END OF FEMUR, UNSPECIFIED FRACTURE MORPHOLOGY, INITIAL ENCOUNTER (HCC): Primary | ICD-10-CM

## 2021-05-13 PROCEDURE — 99024 POSTOP FOLLOW-UP VISIT: CPT | Performed by: ORTHOPAEDIC SURGERY

## 2021-05-15 VITALS — OXYGEN SATURATION: 91 % | HEART RATE: 91 BPM | HEIGHT: 62 IN | WEIGHT: 185 LBS | BODY MASS INDEX: 34.04 KG/M2

## 2021-05-18 DIAGNOSIS — Z96.652 S/P TOTAL KNEE ARTHROPLASTY, LEFT: Primary | ICD-10-CM

## 2021-05-18 RX ORDER — ACETAMINOPHEN AND CODEINE PHOSPHATE 300; 30 MG/1; MG/1
1 TABLET ORAL EVERY 12 HOURS PRN
Qty: 40 TABLET | Refills: 0 | Status: SHIPPED | OUTPATIENT
Start: 2021-05-18 | End: 2021-05-19

## 2021-05-18 NOTE — TELEPHONE ENCOUNTER
PATIENT CALLED AND IS REQUESTING A PRESCRIPTION OF TYLENOL # 3 TO BE SENT TO [facility] IN [place].  SHE IS S/P LEFT S/C FEMUR FRACTURE DATE OF INJURY 4/07/21./HALLIE

## 2021-05-18 NOTE — TELEPHONE ENCOUNTER
CHANTELLE WITH [facility] CALLED AND STATED THAT PATIENT PICKED UP A 30 DAY SUPPLY OF TYLENOL #3 THAT WAS PRESCRIBED BY HER PCP, DR. NICHELLE OWENS, ON 5/04/21.    DOES DR. SIMPSON STILL WANT THIS PRESCRIPTION TO BE FILLED?

## 2021-05-19 NOTE — TELEPHONE ENCOUNTER
CALLED [facility] AND SPOKE WITH JOE AND LET HER KNOW THAT DR. SIMPSON WANTS TO CANCEL THE PRESCRIPTION FOR PATIENT SINCE SHE IS GETTING THIS MEDICATION FROM HER PCP.  JOE EXPRESSED UNDERSTANDING AND WILL CANCEL THE PRESCRIPTION

## 2021-06-07 ENCOUNTER — TELEPHONE (OUTPATIENT)
Dept: ORTHOPEDIC SURGERY | Facility: CLINIC | Age: 71
End: 2021-06-07

## 2021-06-07 NOTE — TELEPHONE ENCOUNTER
Caller: HEIDI ROBLES     Relationship: SELF     Best call back number: [phone]    Medication needed:   Requested Prescriptions     Pending Prescriptions Disp Refills   • celecoxib (CeleBREX) 100 MG capsule         When do you need the refill by: ASAP     What additional details did the patient provide when requesting the medication: PATIENT STATES THAT SHE WOULD LIKE THIS PRESCRIPTION BECAUSE SHE IS IN PAIN     What is the patient's preferred pharmacy: [id number] - [address] [phone] [facility] [phone] FX

## 2021-06-08 RX ORDER — CELECOXIB 100 MG/1
100 CAPSULE ORAL 2 TIMES DAILY
Qty: 40 CAPSULE | Refills: 1 | Status: SHIPPED | OUTPATIENT
Start: 2021-06-08

## 2021-06-21 DIAGNOSIS — Z96.652 S/P TOTAL KNEE ARTHROPLASTY, LEFT: Primary | ICD-10-CM

## 2021-06-21 DIAGNOSIS — S72.409A CLOSED FRACTURE OF DISTAL END OF FEMUR, UNSPECIFIED FRACTURE MORPHOLOGY, INITIAL ENCOUNTER (HCC): ICD-10-CM

## 2021-06-24 ENCOUNTER — HOSPITAL ENCOUNTER (OUTPATIENT)
Dept: GENERAL RADIOLOGY | Facility: HOSPITAL | Age: 71
Discharge: HOME | End: 2021-06-24

## 2021-06-24 ENCOUNTER — OFFICE VISIT (OUTPATIENT)
Dept: ORTHOPEDIC SURGERY | Facility: CLINIC | Age: 71
End: 2021-06-24

## 2021-06-24 VITALS — HEIGHT: 62 IN | BODY MASS INDEX: 32.2 KG/M2 | TEMPERATURE: 98.2 F | WEIGHT: 175 LBS

## 2021-06-24 DIAGNOSIS — S72.409A CLOSED FRACTURE OF DISTAL END OF FEMUR, UNSPECIFIED FRACTURE MORPHOLOGY, INITIAL ENCOUNTER (HCC): Primary | ICD-10-CM

## 2021-06-24 DIAGNOSIS — Z96.652 S/P TOTAL KNEE ARTHROPLASTY, LEFT: ICD-10-CM

## 2021-06-24 DIAGNOSIS — S72.409A CLOSED FRACTURE OF DISTAL END OF FEMUR, UNSPECIFIED FRACTURE MORPHOLOGY, INITIAL ENCOUNTER (HCC): ICD-10-CM

## 2021-06-24 PROCEDURE — 99024 POSTOP FOLLOW-UP VISIT: CPT | Performed by: ORTHOPAEDIC SURGERY

## 2021-06-24 PROCEDURE — 73560 X-RAY EXAM OF KNEE 1 OR 2: CPT

## 2021-06-24 PROCEDURE — 73552 X-RAY EXAM OF FEMUR 2/>: CPT

## 2021-09-15 DIAGNOSIS — S72.409A CLOSED FRACTURE OF DISTAL END OF FEMUR, UNSPECIFIED FRACTURE MORPHOLOGY, INITIAL ENCOUNTER (HCC): Primary | ICD-10-CM

## 2022-11-07 ENCOUNTER — OFFICE VISIT (OUTPATIENT)
Dept: ORTHOPEDIC SURGERY | Facility: CLINIC | Age: 72
End: 2022-11-07

## 2022-11-07 ENCOUNTER — TELEPHONE (OUTPATIENT)
Dept: ORTHOPEDIC SURGERY | Facility: CLINIC | Age: 72
End: 2022-11-07

## 2022-11-07 VITALS — BODY MASS INDEX: 32.2 KG/M2 | TEMPERATURE: 98.6 F | WEIGHT: 175 LBS | HEIGHT: 62 IN

## 2022-11-07 DIAGNOSIS — S52.552A OTHER CLOSED EXTRA-ARTICULAR FRACTURE OF DISTAL END OF LEFT RADIUS, INITIAL ENCOUNTER: Primary | ICD-10-CM

## 2022-11-07 PROCEDURE — 99213 OFFICE O/P EST LOW 20 MIN: CPT | Performed by: ORTHOPAEDIC SURGERY

## 2022-11-07 PROCEDURE — 25600 CLTX DST RDL FX/EPHYS SEP WO: CPT | Performed by: ORTHOPAEDIC SURGERY

## 2022-11-07 NOTE — TELEPHONE ENCOUNTER
PATIENT AND HER DAUGHTER CALLED BACK CHECKING STATUS OF THIS MESSAGE.  PATIENT HAS BEEN SCHEDULED TO SEE DR. SIMPSON THIS AFTERNOON

## 2022-11-07 NOTE — PROGRESS NOTES
"Chief Complaint  Pain, Fall, and Establish Care of the Left Wrist    Subjective    History of Present Illness      Ronit Sarkar is a 72 y.o. female who presents to [facility] for fall with distal radius fracture.  History of Present Illness the patient has significantly impaired mobility anyway as she is becoming elderly and more frail.  She stumbled and fell last week.  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  The patient states that she fell on 6 November 2022 and braced her fall with outstretched upper extremity.  The patient states that her pain is moderately severe.  She rates it a 2 on a scale of 1-10.  The patient describes her pain as a dull aching pain.  There is no clinical deformity.  She is definitely not a candidate for any type of surgical intervention.  Pain Location:  LEFT wrist  Radiation: none  Quality: dull, aching  Intensity/Severity: moderate  Duration: Two days  Progression of symptoms: yes, progressive worsening  Onset quality: sudden  Timing: intermittent  Aggravating Factors: repetitive motion  Alleviating Factors: NSAIDs  Previous Episodes: yes  Associated Symptoms: pain, swelling  ADLs Affected: grooming/hygiene/toileting/personal care  Previous Treatment: NSAIDs       Objective   Vital Signs:   Temp 98.6 °F (37 °C)   Ht 157.5 cm (62\")   Wt 79.4 kg (175 lb)   BMI 32.01 kg/m²     Physical Exam  Physical Exam  Vitals signs and nursing note reviewed.   Constitutional:       Appearance: Normal appearance.   Pulmonary:      Effort: Pulmonary effort is normal.   Skin:     General: Skin is warm and dry.      Capillary Refill: Capillary refill takes less than 2 seconds.   Neurological:      General: No focal deficit present.      Mental Status: He is alert and oriented to person, place, and time. Mental status is at baseline.   Psychiatric:         Mood and Affect: Mood normal.         Behavior: Behavior normal.         " Thought Content: Thought content normal.         Judgment: Judgment normal.     Ortho Exam   Left wrist-distal radius/ulnar styloid fracture. Patient is right hand dominant. The distal radius is swollen. Skin and soft tissues are bruised. There is some shortening of the distal radius compared to the distal ulna. There is an angular tilt with disorientation of the articular surface which is not pointed in its normal volar inclination. Length of the radius is reduced compared to the distal ulna. There is tenderness consistent with a fracture of the distal radial metaphysis. Wrist range of motion is significantly compromised because of pain swelling. The ulnar styloid process is also somewhat tender. There appears to be a soft tissue injury to the medial side of the wrist as well.          Result Review :   The following data was reviewed by: Robbin Maloney MD on 11/07/2022:                Procedures           Assessment   Assessment and Plan    Diagnoses and all orders for this visit:    1. Other closed extra-articular fracture of distal end of left radius, initial encounter (Primary)          Follow Up   · Nonoperative management of the distal radius fracture discussed with the patient and her daughter.  · Application of a fiberglass cast of the fingers in an intrinsic plus position.  · Calcium and vitamin D for bone health.  · Falls precaution.  · Rest, ice, compression, and elevation (RICE) therapy  · Stretching and strengthening exercises  · OTC Tylenol 500-1000mg by mouth every 6 hours as needed for pain   · Follow up in 4 week(s)  • Patient was given instructions and counseling regarding her condition or for health maintenance advice. Please see specific information pulled into the AVS if appropriate.     Robbin Maloney MD   Date of Encounter: 11/7/2022        EMR Dragon/Transcription disclaimer:  Much of this encounter note is an electronic transcription/translation of spoken language to printed text. The  electronic translation of spoken language may permit erroneous, or at times, nonsensical words or phrases to be inadvertently transcribed; Although I have reviewed the note for such errors, some may still exist.

## 2022-11-07 NOTE — TELEPHONE ENCOUNTER
UNABLE TO WT CLINICAL     Caller: ABNER    Relationship to patient: PATIENT'S DAUGHTER   Best call back number:[phone]     Patient is needing: PATIENT SEEN AT [facility] ED 11/07/2022. . AFTER A FALL AT HOME, FX LEFT WRIST. XR OR LEFT WRIST, PATIENT HAS ON DISC. PATIENT WAS PLACED IN [place] _ REQUESTING DR SIMPSON

## 2022-11-20 PROBLEM — S52.502A CLOSED FRACTURE OF LEFT DISTAL RADIUS: Status: ACTIVE | Noted: 2022-11-20

## 2025-03-20 ENCOUNTER — OUTSIDE FACILITY SERVICE (OUTPATIENT)
Dept: CARDIOLOGY | Facility: CLINIC | Age: 75
End: 2025-03-20
Payer: MEDICARE

## 2025-03-20 PROCEDURE — 93010 ELECTROCARDIOGRAM REPORT: CPT | Performed by: INTERNAL MEDICINE

## 2025-03-21 ENCOUNTER — OUTSIDE FACILITY SERVICE (OUTPATIENT)
Dept: CARDIOLOGY | Facility: CLINIC | Age: 75
End: 2025-03-21
Payer: MEDICARE

## 2025-03-21 PROCEDURE — 93010 ELECTROCARDIOGRAM REPORT: CPT | Performed by: INTERNAL MEDICINE

## 2025-03-22 ENCOUNTER — OUTSIDE FACILITY SERVICE (OUTPATIENT)
Dept: CARDIOLOGY | Facility: CLINIC | Age: 75
End: 2025-03-22
Payer: MEDICARE

## 2025-03-22 PROCEDURE — 93010 ELECTROCARDIOGRAM REPORT: CPT | Performed by: INTERNAL MEDICINE

## 2025-03-30 ENCOUNTER — INPATIENT HOSPITAL (OUTPATIENT)
Dept: URBAN - METROPOLITAN AREA HOSPITAL 107 | Facility: HOSPITAL | Age: 75
End: 2025-03-30
Payer: MEDICARE

## 2025-03-30 DIAGNOSIS — K92.2 GASTROINTESTINAL HEMORRHAGE, UNSPECIFIED: ICD-10-CM

## 2025-03-30 DIAGNOSIS — K25.9 GASTRIC ULCER, UNSPECIFIED AS ACUTE OR CHRONIC, WITHOUT HEMO: ICD-10-CM

## 2025-03-30 DIAGNOSIS — R79.89 OTHER SPECIFIED ABNORMAL FINDINGS OF BLOOD CHEMISTRY: ICD-10-CM

## 2025-03-30 PROCEDURE — 99223 1ST HOSP IP/OBS HIGH 75: CPT | Performed by: INTERNAL MEDICINE

## 2025-03-31 ENCOUNTER — INPATIENT HOSPITAL (OUTPATIENT)
Dept: URBAN - METROPOLITAN AREA HOSPITAL 107 | Facility: HOSPITAL | Age: 75
End: 2025-03-31
Payer: MEDICARE

## 2025-03-31 DIAGNOSIS — K59.00 CONSTIPATION, UNSPECIFIED: ICD-10-CM

## 2025-03-31 PROCEDURE — 99232 SBSQ HOSP IP/OBS MODERATE 35: CPT

## 2025-04-01 ENCOUNTER — INPATIENT HOSPITAL (OUTPATIENT)
Dept: URBAN - METROPOLITAN AREA HOSPITAL 107 | Facility: HOSPITAL | Age: 75
End: 2025-04-01
Payer: MEDICARE

## 2025-04-01 DIAGNOSIS — K92.2 GASTROINTESTINAL HEMORRHAGE, UNSPECIFIED: ICD-10-CM

## 2025-04-01 PROCEDURE — 99232 SBSQ HOSP IP/OBS MODERATE 35: CPT | Performed by: INTERNAL MEDICINE
